# Patient Record
Sex: FEMALE | Race: OTHER | HISPANIC OR LATINO | Employment: PART TIME | URBAN - METROPOLITAN AREA
[De-identification: names, ages, dates, MRNs, and addresses within clinical notes are randomized per-mention and may not be internally consistent; named-entity substitution may affect disease eponyms.]

---
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
## 2022-01-03 PROCEDURE — 99284 EMERGENCY DEPT VISIT MOD MDM: CPT

## 2022-01-04 ENCOUNTER — HOSPITAL ENCOUNTER (EMERGENCY)
Facility: HOSPITAL | Age: 22
Discharge: HOME | End: 2022-01-04
Attending: EMERGENCY MEDICINE
Payer: COMMERCIAL

## 2022-01-04 ENCOUNTER — APPOINTMENT (EMERGENCY)
Dept: ULTRASOUND IMAGING | Facility: HOSPITAL | Age: 22
End: 2022-01-04
Payer: COMMERCIAL

## 2022-01-04 VITALS
SYSTOLIC BLOOD PRESSURE: 117 MMHG | TEMPERATURE: 98 F | RESPIRATION RATE: 16 BRPM | OXYGEN SATURATION: 100 % | HEART RATE: 75 BPM | DIASTOLIC BLOOD PRESSURE: 68 MMHG | WEIGHT: 119.49 LBS

## 2022-01-04 DIAGNOSIS — O21.9 NAUSEA AND VOMITING IN PREGNANCY: Primary | ICD-10-CM

## 2022-01-04 DIAGNOSIS — N39.0 UTI (URINARY TRACT INFECTION): ICD-10-CM

## 2022-01-04 DIAGNOSIS — R10.9 ABDOMINAL PAIN: ICD-10-CM

## 2022-01-04 LAB
ALBUMIN SERPL BCP-MCNC: 3 G/DL (ref 3.5–5)
ALP SERPL-CCNC: 80 U/L (ref 46–384)
ALT SERPL W P-5'-P-CCNC: 17 U/L (ref 12–78)
ANION GAP SERPL CALCULATED.3IONS-SCNC: 9 MMOL/L (ref 4–13)
AST SERPL W P-5'-P-CCNC: 13 U/L (ref 5–45)
BACTERIA UR QL AUTO: ABNORMAL /HPF
BASOPHILS # BLD AUTO: 0.03 THOUSANDS/ΜL (ref 0–0.1)
BASOPHILS NFR BLD AUTO: 1 % (ref 0–1)
BILIRUB SERPL-MCNC: 0.24 MG/DL (ref 0.2–1)
BILIRUB UR QL STRIP: NEGATIVE
BUN SERPL-MCNC: 5 MG/DL (ref 5–25)
CALCIUM ALBUM COR SERPL-MCNC: 9.5 MG/DL (ref 8.3–10.1)
CALCIUM SERPL-MCNC: 8.7 MG/DL (ref 8.3–10.1)
CHLORIDE SERPL-SCNC: 103 MMOL/L (ref 100–108)
CLARITY UR: ABNORMAL
CO2 SERPL-SCNC: 25 MMOL/L (ref 21–32)
COLOR UR: YELLOW
CREAT SERPL-MCNC: 0.48 MG/DL (ref 0.6–1.3)
EOSINOPHIL # BLD AUTO: 0.09 THOUSAND/ΜL (ref 0–0.61)
EOSINOPHIL NFR BLD AUTO: 2 % (ref 0–6)
ERYTHROCYTE [DISTWIDTH] IN BLOOD BY AUTOMATED COUNT: 12.8 % (ref 11.6–15.1)
GFR SERPL CREATININE-BSD FRML MDRD: 142 ML/MIN/1.73SQ M
GLUCOSE SERPL-MCNC: 84 MG/DL (ref 65–140)
GLUCOSE UR STRIP-MCNC: NEGATIVE MG/DL
HCT VFR BLD AUTO: 38 % (ref 34.8–46.1)
HGB BLD-MCNC: 12.7 G/DL (ref 11.5–15.4)
HGB UR QL STRIP.AUTO: NEGATIVE
IMM GRANULOCYTES # BLD AUTO: 0.01 THOUSAND/UL (ref 0–0.2)
IMM GRANULOCYTES NFR BLD AUTO: 0 % (ref 0–2)
KETONES UR STRIP-MCNC: ABNORMAL MG/DL
LEUKOCYTE ESTERASE UR QL STRIP: NEGATIVE
LYMPHOCYTES # BLD AUTO: 1.53 THOUSANDS/ΜL (ref 0.6–4.47)
LYMPHOCYTES NFR BLD AUTO: 35 % (ref 14–44)
MCH RBC QN AUTO: 30.8 PG (ref 26.8–34.3)
MCHC RBC AUTO-ENTMCNC: 33.4 G/DL (ref 31.4–37.4)
MCV RBC AUTO: 92 FL (ref 82–98)
MONOCYTES # BLD AUTO: 0.58 THOUSAND/ΜL (ref 0.17–1.22)
MONOCYTES NFR BLD AUTO: 13 % (ref 4–12)
MUCOUS THREADS UR QL AUTO: ABNORMAL
NEUTROPHILS # BLD AUTO: 2.17 THOUSANDS/ΜL (ref 1.85–7.62)
NEUTS SEG NFR BLD AUTO: 49 % (ref 43–75)
NITRITE UR QL STRIP: NEGATIVE
NON-SQ EPI CELLS URNS QL MICRO: ABNORMAL /HPF
NRBC BLD AUTO-RTO: 0 /100 WBCS
PH UR STRIP.AUTO: 5.5 [PH] (ref 4.5–8)
PLATELET # BLD AUTO: 245 THOUSANDS/UL (ref 149–390)
PMV BLD AUTO: 9.2 FL (ref 8.9–12.7)
POTASSIUM SERPL-SCNC: 3.7 MMOL/L (ref 3.5–5.3)
PROT SERPL-MCNC: 6.8 G/DL (ref 6.4–8.2)
PROT UR STRIP-MCNC: ABNORMAL MG/DL
RBC # BLD AUTO: 4.13 MILLION/UL (ref 3.81–5.12)
RBC #/AREA URNS AUTO: ABNORMAL /HPF
SODIUM SERPL-SCNC: 137 MMOL/L (ref 136–145)
SP GR UR STRIP.AUTO: >=1.03 (ref 1–1.03)
UROBILINOGEN UR QL STRIP.AUTO: 0.2 E.U./DL
WBC # BLD AUTO: 4.41 THOUSAND/UL (ref 4.31–10.16)
WBC #/AREA URNS AUTO: ABNORMAL /HPF

## 2022-01-04 PROCEDURE — 81001 URINALYSIS AUTO W/SCOPE: CPT

## 2022-01-04 PROCEDURE — 87086 URINE CULTURE/COLONY COUNT: CPT

## 2022-01-04 PROCEDURE — 76801 OB US < 14 WKS SINGLE FETUS: CPT

## 2022-01-04 PROCEDURE — 80053 COMPREHEN METABOLIC PANEL: CPT | Performed by: PHYSICIAN ASSISTANT

## 2022-01-04 PROCEDURE — 99284 EMERGENCY DEPT VISIT MOD MDM: CPT | Performed by: PHYSICIAN ASSISTANT

## 2022-01-04 PROCEDURE — 96366 THER/PROPH/DIAG IV INF ADDON: CPT

## 2022-01-04 PROCEDURE — 36415 COLL VENOUS BLD VENIPUNCTURE: CPT | Performed by: PHYSICIAN ASSISTANT

## 2022-01-04 PROCEDURE — 85025 COMPLETE CBC W/AUTO DIFF WBC: CPT | Performed by: PHYSICIAN ASSISTANT

## 2022-01-04 PROCEDURE — 96375 TX/PRO/DX INJ NEW DRUG ADDON: CPT

## 2022-01-04 PROCEDURE — 96365 THER/PROPH/DIAG IV INF INIT: CPT

## 2022-01-04 RX ORDER — CEPHALEXIN 500 MG/1
500 CAPSULE ORAL EVERY 6 HOURS SCHEDULED
Qty: 28 CAPSULE | Refills: 0 | Status: SHIPPED | OUTPATIENT
Start: 2022-01-04 | End: 2022-01-11

## 2022-01-04 RX ORDER — METOCLOPRAMIDE HYDROCHLORIDE 5 MG/ML
10 INJECTION INTRAMUSCULAR; INTRAVENOUS ONCE
Status: COMPLETED | OUTPATIENT
Start: 2022-01-04 | End: 2022-01-04

## 2022-01-04 RX ORDER — ONDANSETRON 4 MG/1
4 TABLET, ORALLY DISINTEGRATING ORAL EVERY 6 HOURS PRN
Qty: 20 TABLET | Refills: 0 | Status: SHIPPED | OUTPATIENT
Start: 2022-01-04

## 2022-01-04 RX ADMIN — METOCLOPRAMIDE 10 MG: 5 INJECTION, SOLUTION INTRAMUSCULAR; INTRAVENOUS at 07:54

## 2022-01-04 RX ADMIN — SODIUM CHLORIDE, SODIUM LACTATE, POTASSIUM CHLORIDE, AND CALCIUM CHLORIDE 1000 ML: .6; .31; .03; .02 INJECTION, SOLUTION INTRAVENOUS at 07:44

## 2022-01-04 NOTE — ED PROVIDER NOTES
History  Chief Complaint   Patient presents with    Vomiting During Pregnancy     12 weeks pregnant - vomiting for the pats 24 hours, abodminal pain  This is a 61-year-old female who is approximately 12 weeks pregnant presents emergency department with lower abdominal pain and vomiting for the last 24 hours  It sudden onset of nausea and vomiting with associated lower abdominal pain without fever or chills  No cough, congestion, shortness of breath or chest pain  Last episode of vomiting was 2 hours ago  Denies diarrhea  Reports history of previous miscarriage and denies any vaginal bleeding but is concerned given abdominal pain  Denies vaginal discharge or leaking  None       History reviewed  No pertinent past medical history  History reviewed  No pertinent surgical history  History reviewed  No pertinent family history  I have reviewed and agree with the history as documented  E-Cigarette/Vaping     E-Cigarette/Vaping Substances     Social History     Tobacco Use    Smoking status: Never Smoker    Smokeless tobacco: Never Used   Substance Use Topics    Alcohol use: Never    Drug use: Never       Review of Systems   Constitutional: Negative for chills and fever  HENT: Negative for ear pain and sore throat  Eyes: Negative for pain and visual disturbance  Respiratory: Negative for cough and shortness of breath  Cardiovascular: Negative for chest pain and palpitations  Gastrointestinal: Positive for abdominal pain, nausea and vomiting  Genitourinary: Negative for dysuria and hematuria  Musculoskeletal: Negative for arthralgias and back pain  Skin: Negative for color change and rash  Neurological: Negative for seizures and syncope  Psychiatric/Behavioral: Positive for sleep disturbance  Negative for confusion  All other systems reviewed and are negative  Physical Exam  Physical Exam  Vitals and nursing note reviewed     Constitutional:       General: She is not in acute distress  Appearance: Normal appearance  She is not ill-appearing, toxic-appearing or diaphoretic  HENT:      Head: Normocephalic and atraumatic  Mouth/Throat:      Mouth: Mucous membranes are moist    Eyes:      General: No scleral icterus  Pupils: Pupils are equal, round, and reactive to light  Cardiovascular:      Rate and Rhythm: Normal rate and regular rhythm  Pulses: Normal pulses  Heart sounds: Normal heart sounds  Pulmonary:      Effort: Pulmonary effort is normal       Breath sounds: Normal breath sounds  Abdominal:      General: Abdomen is flat  There is no distension  Palpations: Abdomen is soft  Tenderness: There is no abdominal tenderness  There is no guarding or rebound  Hernia: No hernia is present  Musculoskeletal:         General: No swelling or tenderness  Normal range of motion  Cervical back: Normal range of motion  Skin:     General: Skin is warm  Capillary Refill: Capillary refill takes less than 2 seconds  Neurological:      General: No focal deficit present  Mental Status: She is alert     Psychiatric:         Mood and Affect: Mood normal          Behavior: Behavior normal          Vital Signs  ED Triage Vitals [01/03/22 1749]   Temperature Pulse Respirations Blood Pressure SpO2   98 °F (36 7 °C) 97 18 114/70 99 %      Temp Source Heart Rate Source Patient Position - Orthostatic VS BP Location FiO2 (%)   Oral Monitor Sitting Right arm --      Pain Score       7           Vitals:    01/03/22 1749 01/04/22 0522 01/04/22 1013   BP: 114/70 108/60 117/68   Pulse: 97 93 75   Patient Position - Orthostatic VS: Sitting  Lying         Visual Acuity      ED Medications  Medications   lactated ringers bolus 1,000 mL (0 mL Intravenous Stopped 1/4/22 1013)   metoclopramide (REGLAN) injection 10 mg (10 mg Intravenous Given 1/4/22 0754)       Diagnostic Studies  Results Reviewed     Procedure Component Value Units Date/Time Urine Microscopic [[id number]]  (Abnormal) Collected: 01/04/22 0858    Lab Status: Final result Specimen: Urine, Clean Catch Updated: 01/04/22 0954     RBC, UA None Seen /hpf      WBC, UA 2-4 /hpf      Epithelial Cells Moderate /hpf      Bacteria, UA Moderate /hpf      MUCUS THREADS Innumerable    Urine culture [[id number]] Collected: 01/04/22 0858    Lab Status:  In process Specimen: Urine, Clean Catch Updated: 01/04/22 0910    Urine Macroscopic, POC [[id number]]  (Abnormal) Collected: 01/04/22 0858    Lab Status: Final result Specimen: Urine Updated: 01/04/22 0859     Color, UA Yellow     Clarity, UA Slightly Cloudy     pH, UA 5 5     Leukocytes, UA Negative     Nitrite, UA Negative     Protein, UA 30 (1+) mg/dl      Glucose, UA Negative mg/dl      Ketones, UA 15 (1+) mg/dl      Urobilinogen, UA 0 2 E U /dl      Bilirubin, UA Negative     Blood, UA Negative     Specific Gravity, UA >=1 030    Narrative:      CLINITEK RESULT    Comprehensive metabolic panel [[id number]]  (Abnormal) Collected: 01/04/22 0743    Lab Status: Final result Specimen: Blood from Arm, Right Updated: 01/04/22 0842     Sodium 137 mmol/L      Potassium 3 7 mmol/L      Chloride 103 mmol/L      CO2 25 mmol/L      ANION GAP 9 mmol/L      BUN 5 mg/dL      Creatinine 0 48 mg/dL      Glucose 84 mg/dL      Calcium 8 7 mg/dL      Corrected Calcium 9 5 mg/dL      AST 13 U/L      ALT 17 U/L      Alkaline Phosphatase 80 U/L      Total Protein 6 8 g/dL      Albumin 3 0 g/dL      Total Bilirubin 0 24 mg/dL      eGFR 142 ml/min/1 73sq m     Narrative:      Zacarias guidelines for Chronic Kidney Disease (CKD):     Stage 1 with normal or high GFR (GFR > 90 mL/min/1 73 square meters)    Stage 2 Mild CKD (GFR = 60-89 mL/min/1 73 square meters)    Stage 3A Moderate CKD (GFR = 45-59 mL/min/1 73 square meters)    Stage 3B Moderate CKD (GFR = 30-44 mL/min/1 73 square meters)    Stage 4 Severe CKD (GFR = 15-29 mL/min/1 73 square meters)   Stage 5 End Stage CKD (GFR <15 mL/min/1 73 square meters)  Note: GFR calculation is accurate only with a steady state creatinine    CBC and differential [[id number]]  (Abnormal) Collected: 01/04/22 0743    Lab Status: Final result Specimen: Blood from Arm, Right Updated: 01/04/22 0811     WBC 4 41 Thousand/uL      RBC 4 13 Million/uL      Hemoglobin 12 7 g/dL      Hematocrit 38 0 %      MCV 92 fL      MCH 30 8 pg      MCHC 33 4 g/dL      RDW 12 8 %      MPV 9 2 fL      Platelets 333 Thousands/uL      nRBC 0 /100 WBCs      Neutrophils Relative 49 %      Immat GRANS % 0 %      Lymphocytes Relative 35 %      Monocytes Relative 13 %      Eosinophils Relative 2 %      Basophils Relative 1 %      Neutrophils Absolute 2 17 Thousands/µL      Immature Grans Absolute 0 01 Thousand/uL      Lymphocytes Absolute 1 53 Thousands/µL      Monocytes Absolute 0 58 Thousand/µL      Eosinophils Absolute 0 09 Thousand/µL      Basophils Absolute 0 03 Thousands/µL                  US OB < 14 weeks single or first gestation level 1   Final Result by Interface, Radiology Results In (01/04 1454)      Single live intrauterine gestation at 12 weeks 0 days (range +/- one week 1 day)  GILMA of 7/19/2022  Workstation performed: [id number]                    Procedures  Procedures         ED Course  ED Course as of 01/04/22 1612   Tue Jan 04, 2022   0847 CMP normal   0859 Immature Grans Absolute: 0 01   3535 Patient reassessed- feeling better, reviewed US with patient, will get remaining fluids and then plan for d/c to home and OB follow up   0905 PO challenge   0939 US OB IMPRESSION:     Single live intrauterine gestation at 12 weeks 0 days (range +/- one week 1 day)  4405 Concern for UTI on microscopic urine- will start cephalexin         CRAFFT      Most Recent Value   SBIRT (13-23 yo)    In order to provide better care to our patients, we are screening all of our patients for alcohol and drug use   Would it be okay to ask you these screening questions? No Filed at: 01/04/2022 0839                                    [facility]  Number of Diagnoses or Management Options  Nausea and vomiting in pregnancy: new and requires workup  UTI (urinary tract infection): new and requires workup     Amount and/or Complexity of Data Reviewed  Clinical lab tests: ordered and reviewed  Tests in the radiology section of CPT®: ordered and reviewed  Independent visualization of images, tracings, or specimens: yes    Risk of Complications, Morbidity, and/or Mortality  Presenting problems: moderate  Diagnostic procedures: moderate  Management options: moderate        Disposition  Final diagnoses:   Nausea and vomiting in pregnancy   UTI (urinary tract infection)     Time reflects when diagnosis was documented in both MDM as applicable and the Disposition within this note     Time User Action Codes Description Comment    1/4/2022 10:03 AM Nancy Medellin Add [O21 9] Nausea and vomiting in pregnancy     1/4/2022 10:04 AM Nancy Medellin Add [N39 0] UTI (urinary tract infection)     1/4/2022  2:54 PM Shellie  Add [R10 9] Abdominal pain       ED Disposition     ED Disposition Condition Date/Time Comment    Discharge Stable Tue Jan 4, 2022 10:04 AM Bel Guzman discharge to home/self care              Follow-up Information     Follow up With Specialties Details Why Contact Info Additional Information    Radha Alberts MD Family Medicine  As needed [place] [address]  [phone]       Critical access hospital[facility] Emergency Department Emergency Medicine  If symptoms worsen [address] [address]  [facility] Emergency Department, [address] [place] [address], [address]          Discharge Medication List as of 1/4/2022 10:06 AM      START taking these medications    Details   cephalexin (KEFLEX) 500 mg capsule Take 1 capsule (500 mg total) by mouth every 6 (six) hours for 7 days, Starting Tue 1/4/2022, Until Tue 1/11/2022, Normal      ondansetron (Zofran ODT) 4 mg disintegrating tablet Take 1 tablet (4 mg total) by mouth every 6 (six) hours as needed for nausea or vomiting, Starting Tue 1/4/2022, Normal             No discharge procedures on file      PDMP Review     None          ED Provider  Electronically Signed by           Viraj West PA-C  01/04/22 8972

## 2022-01-04 NOTE — DISCHARGE INSTRUCTIONS
Call OB to schedule follow up    Results for orders placed or performed during the hospital encounter of 01/04/22   CBC and differential   Result Value Ref Range    WBC 4 41 4 31 - 10 16 Thousand/uL    RBC 4 13 3 81 - 5 12 Million/uL    Hemoglobin 12 7 11 5 - 15 4 g/dL    Hematocrit 38 0 34 8 - 46 1 %    MCV 92 82 - 98 fL    MCH 30 8 26 8 - 34 3 pg    MCHC 33 4 31 4 - 37 4 g/dL    RDW 12 8 11 6 - 15 1 %    MPV 9 2 8 9 - 12 7 fL    Platelets 683 723 - 832 Thousands/uL    nRBC 0 /100 WBCs    Neutrophils Relative 49 43 - 75 %    Immat GRANS % 0 0 - 2 %    Lymphocytes Relative 35 14 - 44 %    Monocytes Relative 13 (H) 4 - 12 %    Eosinophils Relative 2 0 - 6 %    Basophils Relative 1 0 - 1 %    Neutrophils Absolute 2 17 1 85 - 7 62 Thousands/µL    Immature Grans Absolute 0 01 0 00 - 0 20 Thousand/uL    Lymphocytes Absolute 1 53 0 60 - 4 47 Thousands/µL    Monocytes Absolute 0 58 0 17 - 1 22 Thousand/µL    Eosinophils Absolute 0 09 0 00 - 0 61 Thousand/µL    Basophils Absolute 0 03 0 00 - 0 10 Thousands/µL   Comprehensive metabolic panel   Result Value Ref Range    Sodium 137 136 - 145 mmol/L    Potassium 3 7 3 5 - 5 3 mmol/L    Chloride 103 100 - 108 mmol/L    CO2 25 21 - 32 mmol/L    ANION GAP 9 4 - 13 mmol/L    BUN 5 5 - 25 mg/dL    Creatinine 0 48 (L) 0 60 - 1 30 mg/dL    Glucose 84 65 - 140 mg/dL    Calcium 8 7 8 3 - 10 1 mg/dL    Corrected Calcium 9 5 8 3 - 10 1 mg/dL    AST 13 5 - 45 U/L    ALT 17 12 - 78 U/L    Alkaline Phosphatase 80 46 - 384 U/L    Total Protein 6 8 6 4 - 8 2 g/dL    Albumin 3 0 (L) 3 5 - 5 0 g/dL    Total Bilirubin 0 24 0 20 - 1 00 mg/dL    eGFR 142 ml/min/1 73sq m   Urine Microscopic   Result Value Ref Range    RBC, UA None Seen None Seen, 2-4 /hpf    WBC, UA 2-4 None Seen, 2-4, 5-60 /hpf    Epithelial Cells Moderate (A) None Seen, Occasional /hpf    Bacteria, UA Moderate (A) None Seen, Occasional /hpf    MUCUS THREADS Innumerable (A) None Seen   Urine Macroscopic, POC   Result Value Ref Range    Color, UA Yellow     Clarity, UA Slightly Cloudy     pH, UA 5 5 4 5 - 8 0    Leukocytes, UA Negative Negative    Nitrite, UA Negative Negative    Protein, UA 30 (1+) (A) Negative mg/dl    Glucose, UA Negative Negative mg/dl    Ketones, UA 15 (1+) (A) Negative mg/dl    Urobilinogen, UA 0 2 0 2, 1 0 E U /dl E U /dl    Bilirubin, UA Negative Negative    Blood, UA Negative Negative    Specific Gravity, UA >=1 030 1 003 - 1 030     US OB limited Dating Study   Final Result      Single live intrauterine gestation at 12 weeks 0 days (range +/- one week 1 day)  GILMA of 7/19/2022              Workstation performed: [id number]

## 2022-01-05 LAB — BACTERIA UR CULT: NORMAL

## 2022-01-13 ENCOUNTER — HOSPITAL ENCOUNTER (EMERGENCY)
Facility: HOSPITAL | Age: 22
Discharge: HOME | End: 2022-01-13
Attending: EMERGENCY MEDICINE | Admitting: EMERGENCY MEDICINE
Payer: COMMERCIAL

## 2022-01-13 VITALS
DIASTOLIC BLOOD PRESSURE: 59 MMHG | OXYGEN SATURATION: 100 % | RESPIRATION RATE: 18 BRPM | HEART RATE: 92 BPM | WEIGHT: 120.37 LBS | SYSTOLIC BLOOD PRESSURE: 140 MMHG | TEMPERATURE: 98.2 F

## 2022-01-13 DIAGNOSIS — R10.9 ABDOMINAL PAIN DURING PREGNANCY: Primary | ICD-10-CM

## 2022-01-13 DIAGNOSIS — O26.899 ABDOMINAL PAIN DURING PREGNANCY: Primary | ICD-10-CM

## 2022-01-13 DIAGNOSIS — S61.309A TRAUMATIC AVULSION OF NAIL PLATE OF FINGER, INITIAL ENCOUNTER: ICD-10-CM
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
## 2022-01-13 LAB
BACTERIA UR QL AUTO: ABNORMAL /HPF
BILIRUB UR QL STRIP: NEGATIVE
CLARITY UR: CLEAR
COLOR UR: YELLOW
GLUCOSE UR STRIP-MCNC: NEGATIVE MG/DL
HGB UR QL STRIP.AUTO: NEGATIVE
KETONES UR STRIP-MCNC: ABNORMAL MG/DL
LEUKOCYTE ESTERASE UR QL STRIP: NEGATIVE
MUCOUS THREADS UR QL AUTO: ABNORMAL
NITRITE UR QL STRIP: NEGATIVE
NON-SQ EPI CELLS URNS QL MICRO: ABNORMAL /HPF
PH UR STRIP.AUTO: 6 [PH] (ref 4.5–8)
PROT UR STRIP-MCNC: ABNORMAL MG/DL
RBC #/AREA URNS AUTO: ABNORMAL /HPF
SP GR UR STRIP.AUTO: >=1.03 (ref 1–1.03)
UROBILINOGEN UR QL STRIP.AUTO: 2 E.U./DL
WBC #/AREA URNS AUTO: ABNORMAL /HPF

## 2022-01-13 PROCEDURE — 87086 URINE CULTURE/COLONY COUNT: CPT

## 2022-01-13 PROCEDURE — 99284 EMERGENCY DEPT VISIT MOD MDM: CPT

## 2022-01-13 PROCEDURE — 99285 EMERGENCY DEPT VISIT HI MDM: CPT | Performed by: EMERGENCY MEDICINE

## 2022-01-13 PROCEDURE — 81001 URINALYSIS AUTO W/SCOPE: CPT

## 2022-01-13 NOTE — ED PROVIDER NOTES
History  Chief Complaint   Patient presents with    Abdominal Pain Pregnant     12 weeks preganant - reporting abdominal pain  Denies n/v  Also reporting multiple nails removed due to trauma/injury  A 25year-old female who is  at 13w2d (by GILMA 22, noted on recent US); presents after a fall down steps that occurred two days ago  Patient states she was carrying her son's stroller, when she slipped and fell down approximately 5 steps  Patient landed on her left hip  Patient denies direct abdominal trauma  Patient denies striking her head and loss of consciousness  Patient does report that during the fall, she attempted to grab the banister which lead to the traumatic removal of two nails  Patient has been keeping the nail beds clean, although has persistent pain  Patient also reports having an episode of lower abdominal pain earlier today, which has since resolved  Pain radiated from the low back around towards the lower abdomen  Patient denies associated vaginal bleeding and discharge  Patient otherwise denies fever, chills, headache, dizziness, lightheadedness, neck pain, back pain, chest pain, shortness of breath, nausea, vomiting, diarrhea, peripheral edema and rashes  Patient contacted her OB at [address] who referred her to the ED for further evaluation  A/P:  Mechanical fall down steps, no direct abdominal trauma  Benign abdominal exam   Avulsed nails off ring and little finger of right hand  Pt otherwise resting comfortably, neurologically in tact  Patient is Rh+  Will perform bedside ultrasound to evaluate for fetal viability  Will check urine for infection  Recommend local wound care for her avulsed nails  History provided by:  Patient and medical records   used: Yes ("[employer] [id number])       Prior to Admission Medications   Prescriptions Last Dose Informant Patient Reported?  Taking?   ondansetron (Zofran ODT) 4 mg disintegrating tablet   No No   Sig: Take 1 tablet (4 mg total) by mouth every 6 (six) hours as needed for nausea or vomiting      Facility-Administered Medications: None       History reviewed  No pertinent past medical history  History reviewed  No pertinent surgical history  History reviewed  No pertinent family history  I have reviewed and agree with the history as documented  E-Cigarette/Vaping     E-Cigarette/Vaping Substances     Social History     Tobacco Use    Smoking status: Never Smoker    Smokeless tobacco: Never Used   Substance Use Topics    Alcohol use: Never    Drug use: Never       Review of Systems   Gastrointestinal: Positive for abdominal pain  Skin: Positive for wound  All other systems reviewed and are negative  Physical Exam  Physical Exam  General Appearance: alert and oriented, nad, non toxic appearing  Skin:  Warm, dry, intact  HEENT: atraumatic, normocephalic  Neck: Supple, trachea midline  Cardiac: RRR; no murmurs, rub, gallops  Pulmonary: lungs CTAB; no wheezes, rales, rhonchi  Gastrointestinal: abdomen soft, nontender, nondistended; no guarding or rebound tenderness; good bowel sounds, no mass or bruits  Extremities:  No midline or paraspinal tenderness  Extremities nontender with full range of motion  Nails of right ring and little finger traumatically removed, nail bed is scabbed over without active bleeding or drainage    No pedal edema, 2+ pulses; no calf tenderness, no clubbing, no cyanosis  Neuro:  no focal motor or sensory deficits, CN 2-12 grossly intact  Psych:  Normal mood and affect, normal judgement and insight      Vital Signs  ED Triage Vitals [01/13/22 1528]   Temperature Pulse Respirations Blood Pressure SpO2   98 2 °F (36 8 °C) 92 18 140/59 100 %      Temp Source Heart Rate Source Patient Position - Orthostatic VS BP Location FiO2 (%)   Oral Monitor Sitting Right arm --      Pain Score       --           Vitals:    01/13/22 1528   BP: 140/59   Pulse: 92   Patient Position - Orthostatic VS: Sitting         Visual Acuity      ED Medications  Medications - No data to display    Diagnostic Studies  Results Reviewed     Procedure Component Value Units Date/Time    Urine Microscopic [[id number]]  (Abnormal) Collected: 01/13/22 1719    Lab Status: Final result Specimen: Urine, Clean Catch Updated: 01/13/22 1747     RBC, UA None Seen /hpf      WBC, UA 1-2 /hpf      Epithelial Cells Occasional /hpf      Bacteria, UA Innumerable /hpf      MUCUS THREADS Occasional    Urine culture [[id number]] Collected: 01/13/22 1719    Lab Status:  In process Specimen: Urine, Clean Catch Updated: 01/13/22 1723    Urine Macroscopic, POC [[id number]]  (Abnormal) Collected: 01/13/22 1719    Lab Status: Final result Specimen: Urine Updated: 01/13/22 1721     Color, UA Yellow     Clarity, UA Clear     pH, UA 6 0     Leukocytes, UA Negative     Nitrite, UA Negative     Protein, UA Trace mg/dl      Glucose, UA Negative mg/dl      Ketones, UA Trace mg/dl      Urobilinogen, UA 2 0 E U /dl      Bilirubin, UA Negative     Blood, UA Negative     Specific Gravity, UA >=1 030    Narrative:      CLINITEK RESULT                 No orders to display              Procedures  POC Pelvic US    Date/Time: 1/13/2022 5:18 PM  Performed by: Rosalino Han DO  Authorized by: Rosalino Han DO     Patient location:  ED  Procedure details:     Exam Type:  Diagnostic    Indications: pregnant with abdominal pain      Assessment for: evaluate fetal viability      Technique:  Transabdominal obstetric (HCG+) exam    Views obtained: uterus (transverse and sagittal)      Image quality: diagnostic    Uterine findings:     Intrauterine pregnancy: identified      Single gestation: identified      Gestational sac: identified      Yolk sac: identified      Fetal pole: identified      Fetal heart rate: identified      Fetal heart rate (bpm):  155  Interpretation:     Ultrasound impressions: normal      Pregnancy findings: intrauterine pregnancy (IUP) Comments:      Fetal movement appreciated             ED Course  ED Course as of 01/13/22 2312   Thu Jan 13, 2022   1721 Leukocytes, UA: Negative  UA negative for infection                                             MDM    Disposition  Final diagnoses:   Abdominal pain during pregnancy   Traumatic avulsion of nail plate of finger, initial encounter - right fourth and fifth digits   Fall down steps, initial encounter     Time reflects when diagnosis was documented in both MDM as applicable and the Disposition within this note     Time User Action Codes Description Comment    1/13/2022  5:22 PM Adeel, [address] [O26 899,  R10 9] Abdominal pain during pregnancy     1/13/2022  5:22 PM Anibal Denis Add [S61 309A] Traumatic avulsion of nail plate of finger, initial encounter     1/13/2022  5:22 PM Sinda Flores Modify [S61 309A] Traumatic avulsion of nail plate of finger, initial encounter right fourth and fifth digits    1/13/2022  5:23 PM Adeel, [address] Fall down steps, initial encounter       ED Disposition     ED Disposition Condition Date/Time Comment    Discharge Stable u Jan 13, 2022  5:22 PM Matt Zambrano Nimesh [address] discharge to home/self care              Follow-up Information     Follow up With Specialties Details Why Contact Info Additional Information    OBGYN  Schedule an appointment as soon as possible for a visit in 1 week For re-evaluation      [address] Emergency Department Emergency Medicine Go to  If symptoms worsen [facility] [address]  112 [facility] Emergency Department, [address] [place] [address], [address]          Discharge Medication List as of 1/13/2022  5:29 PM      CONTINUE these medications which have NOT CHANGED    Details   ondansetron (Zofran ODT) 4 mg disintegrating tablet Take 1 tablet (4 mg total) by mouth every 6 (six) hours as needed for nausea or vomiting, Starting Tue 1/4/2022, Normal             No discharge procedures on file      PDMP Review     None          ED Provider  Electronically Signed by           Melida Haile DO  01/13/22 7241

## 2022-01-14 LAB — BACTERIA UR CULT: NORMAL

## 2022-03-30 ENCOUNTER — HOSPITAL ENCOUNTER (OUTPATIENT)
Facility: HOSPITAL | Age: 22
Discharge: HOME | End: 2022-03-30
Attending: OBSTETRICS & GYNECOLOGY | Admitting: OBSTETRICS & GYNECOLOGY
Payer: COMMERCIAL

## 2022-03-30 VITALS
DIASTOLIC BLOOD PRESSURE: 61 MMHG | RESPIRATION RATE: 18 BRPM | SYSTOLIC BLOOD PRESSURE: 100 MMHG | OXYGEN SATURATION: 100 % | TEMPERATURE: 98.2 F | HEART RATE: 88 BPM

## 2022-03-30 PROBLEM — N93.9 VAGINAL SPOTTING: Status: ACTIVE | Noted: 2022-03-30

## 2022-03-30 LAB
BACTERIA UR QL AUTO: ABNORMAL /HPF
BILIRUB UR QL STRIP: NEGATIVE
CLARITY UR: CLEAR
COLOR UR: YELLOW
GLUCOSE UR STRIP-MCNC: NEGATIVE MG/DL
HGB UR QL STRIP.AUTO: ABNORMAL
KETONES UR STRIP-MCNC: NEGATIVE MG/DL
LEUKOCYTE ESTERASE UR QL STRIP: ABNORMAL
NITRITE UR QL STRIP: NEGATIVE
NON-SQ EPI CELLS URNS QL MICRO: ABNORMAL /HPF
PH UR STRIP.AUTO: 6 [PH]
PROT UR STRIP-MCNC: NEGATIVE MG/DL
RBC #/AREA URNS AUTO: ABNORMAL /HPF
SP GR UR STRIP.AUTO: 1.02 (ref 1–1.03)
UROBILINOGEN UR QL STRIP.AUTO: 0.2 E.U./DL
WBC #/AREA URNS AUTO: ABNORMAL /HPF

## 2022-03-30 PROCEDURE — 99213 OFFICE O/P EST LOW 20 MIN: CPT | Performed by: OBSTETRICS & GYNECOLOGY

## 2022-03-30 PROCEDURE — 87591 N.GONORRHOEAE DNA AMP PROB: CPT | Performed by: OBSTETRICS & GYNECOLOGY

## 2022-03-30 PROCEDURE — 99202 OFFICE O/P NEW SF 15 MIN: CPT

## 2022-03-30 PROCEDURE — 87491 CHLMYD TRACH DNA AMP PROBE: CPT | Performed by: OBSTETRICS & GYNECOLOGY

## 2022-03-30 PROCEDURE — 81001 URINALYSIS AUTO W/SCOPE: CPT | Performed by: OBSTETRICS & GYNECOLOGY

## 2022-03-30 NOTE — PROGRESS NOTES
OB Triage Note  Patient of [facility]    Subjective:  23 y o   at 24w1d with vaginal spotting, normal cervical length, normal microsocpy, and no sign of bleeding now  CC:  Vaginal spotting this morning, not provoked by intercourse or other vaginal penetration  Denies cramping except occasional (1-2 per day) contraction  States she was concerned about "swelling" in her perineal area  Admits to possible change in urinary frequency (pt not sure) and states she has had a UTI in the past  (Denies hematuria/dysuria)  HPI:  Contractions:  no  Fetal movement:  yes  Vaginal bleeding:   Yes - approximately 1x2 inches on underwear x 1 this morning  Leaking of fluid:  no    OB complications:  None with this pregnancy  With prior (G1) pregnancy - oligohydramnios       Objective  Vital signs: Blood pressure 100/61, pulse 88, temperature 98 2 °F (36 8 °C), temperature source Axillary, resp  rate 18, SpO2 100 %, not currently breastfeeding  SSE: visually closed  No discharge  No bleeding  Soft ectropion  Clear mucus present  White leukorrhea present  FHT: 150's   [place]:  No ctxn  Ultrasound: transvaginal: Normal Cervical length 3 2 cm  Internal os shows flat membrane across the internal os  Clear mucus visible at internal os  No change in shape or dimension of this sonolucent area  UA: moderate leuk, negative nitrite, occ bacteria  Slides: wet mount normal epithelial cells, KOH no clue cells, dry slide no ferning    Assessment and Plan:  23 y o   at 24w1d with vaginal spotting uncertain etiology  Urine analysis no overt sign of infection, no abx at this time  Pt has appt with her OB tomorrow- recommend following up as scheduled  Pt stable for discharge

## 2022-03-30 NOTE — DISCHARGE INSTRUCTIONS
Pregnancy at 23 to 26 Weeks   AMBULATORY CARE:   What changes are happening to your body: You are now close to or at the beginning of the third trimester  The third trimester starts at 24 weeks and ends with delivery  As your baby gets larger, you may develop certain symptoms  These may include pain in your back or down the sides of your abdomen  You may also have stretch marks on your abdomen, breasts, thighs, or buttocks  You may also have constipation  Seek care immediately if:   · You develop a severe headache that does not go away  · You have new or increased vision changes, such as blurred or spotted vision  · You have new or increased swelling in your face or hands  · You have vaginal spotting or bleeding  · Your water broke or you feel warm water gushing or trickling from your vagina  Call your doctor or obstetrician if:   · You have abdominal cramps, pressure, or tightening  · You have a change in vaginal discharge  · You have light bleeding  · You have chills or a fever  · You have vaginal itching, burning, or pain  · You have yellow, green, white, or foul-smelling vaginal discharge  · You have pain or burning when you urinate, less urine than usual, or pink or bloody urine  · You have questions or concerns about your condition or care  How to care for yourself at this stage of your pregnancy:       · Eat a variety of healthy foods  Healthy foods include fruits, vegetables, whole-grain breads, low-fat dairy foods, beans, lean meats, and fish  Drink liquids as directed  Ask how much liquid to drink each day and which liquids are best for you  Limit caffeine to less than 200 milligrams each day  Limit your intake of fish to 2 servings each week  Choose fish low in mercury such as canned light tuna, shrimp, salmon, cod, or tilapia  Do not  eat fish high in mercury such as swordfish, tilefish, jhonatan mackerel, and shark  · Manage back pain    Do not stand for long periods of time or lift heavy items  Use good posture while you stand, squat, or bend  Wear low-heeled shoes with good support  Rest may also help to relieve back pain  Ask your healthcare provider about exercises you can do to strengthen your back muscles  · Take prenatal vitamins as directed  Your need for certain vitamins and minerals, such as folic acid, increases during pregnancy  Prenatal vitamins provide some of the extra vitamins and minerals you need  Prenatal vitamins may also help to decrease the risk of certain birth defects  · Talk to your healthcare provider about exercise  Moderate exercise can help you stay fit  Your healthcare provider will help you plan an exercise program that is safe for you during pregnancy  · Do not smoke  Smoking increases your risk of a miscarriage and other health problems during your pregnancy  Smoking can cause your baby to be born too early or weigh less at birth  Ask your healthcare provider for information if you need help quitting  · Do not drink alcohol  Alcohol passes from your body to your baby through the placenta  It can affect your baby's brain development and cause fetal alcohol syndrome (FAS)  FAS is a group of conditions that causes mental, behavior, and growth problems  · Talk to your healthcare provider before you take any medicines  Many medicines may harm your baby if you take them when you are pregnant  Do not take any medicines, vitamins, herbs, or supplements without first talking to your healthcare provider  Never use illegal or street drugs (such as marijuana or cocaine) while you are pregnant  Safety tips during pregnancy:   · Avoid hot tubs and saunas  Do not use a hot tub or sauna while you are pregnant, especially during your first trimester  Hot tubs and saunas may raise your baby's temperature and increase the risk of birth defects  · Avoid toxoplasmosis    This is an infection caused by eating raw meat or being around infected cat feces  It can cause birth defects, miscarriages, and other problems  Wash your hands after you touch raw meat  Make sure any meat is well-cooked before you eat it  Avoid raw eggs and unpasteurized milk  Use gloves or ask someone else to clean your cat's litter box while you are pregnant  Changes that are happening with your baby:  By 26 weeks, your baby will weigh about 2 pounds  Your baby will be about 10 inches long from the top of the head to the rump (baby's bottom)  Your baby's movements are much stronger now  Your baby's eyes are almost completely formed and can partially open  Your baby also sleeps and wakes up  What you need to know about prenatal care: Your healthcare provider will check your blood pressure and weight  You may also need the following:  · A urine test  may also be done to check for sugar and protein  These can be signs of gestational diabetes or infection  Protein in your urine may also be a sign of preeclampsia  Preeclampsia is a condition that can develop during week 20 or later of your pregnancy  It causes high blood pressure, and it can cause problems with your kidneys and other organs  · A gestational diabetes screen  may be done  Your healthcare provider may order either a 1-step or 2-step oral glucose tolerance test (OGTT)  ? 1-step OGTT:  Your blood sugar level will be tested after you have not eaten for 8 hours (fasting)  You will then be given a glucose drink  Your level will be tested again 1 hour and 2 hours after you finish the drink  ? 2-step OGTT:  You do not have to fast for the first part of the test  You will have the glucose drink at any time of day  Your blood sugar level will be checked 1 hour later  If your blood sugar is higher than a certain level, another test will be ordered  You will fast and your blood sugar level will be tested  You will have the glucose drink   Your blood will be tested again 1 hour, 2 hours, and 3 hours after you finish the glucose drink  · Fundal height  is a measurement of your uterus to check your baby's growth  This number is usually the same as the number of weeks that you have been pregnant  · Your baby's heart rate  will be checked  Follow up with your doctor or obstetrician as directed:  Write down your questions so you remember to ask them during your visits  © Copyright [employer] 2022 Information is for End User's use only and may not be sold, redistributed or otherwise used for commercial purposes  All illustrations and images included in CareNotes® are the copyrighted property of [employer]  or [address] [place] [address]  The above information is an  only  It is not intended as medical advice for individual conditions or treatments  Talk to your doctor, nurse or pharmacist before following any medical regimen to see if it is safe and effective for you

## 2022-03-31 LAB
C TRACH DNA SPEC QL NAA+PROBE: NEGATIVE
N GONORRHOEA DNA SPEC QL NAA+PROBE: NEGATIVE

## 2022-05-10 ENCOUNTER — HOSPITAL ENCOUNTER (OUTPATIENT)
Facility: HOSPITAL | Age: 22
Setting detail: OBSERVATION
Discharge: ANOTHER HOSPITAL | End: 2022-05-10
Attending: OBSTETRICS & GYNECOLOGY | Admitting: OBSTETRICS & GYNECOLOGY
Payer: COMMERCIAL

## 2022-05-10 VITALS
BODY MASS INDEX: 20.03 KG/M2 | HEIGHT: 65 IN | TEMPERATURE: 98.2 F | DIASTOLIC BLOOD PRESSURE: 73 MMHG | RESPIRATION RATE: 18 BRPM | HEART RATE: 109 BPM | OXYGEN SATURATION: 100 % | SYSTOLIC BLOOD PRESSURE: 98 MMHG

## 2022-05-10 LAB
ABO GROUP BLD: NORMAL
AMPHETAMINES SERPL QL SCN: NEGATIVE
BARBITURATES UR QL: NEGATIVE
BASOPHILS # BLD AUTO: 0.01 THOUSANDS/ΜL (ref 0–0.1)
BASOPHILS NFR BLD AUTO: 0 % (ref 0–1)
BENZODIAZ UR QL: NEGATIVE
BLD GP AB SCN SERPL QL: NEGATIVE
COCAINE UR QL: NEGATIVE
EOSINOPHIL # BLD AUTO: 0.05 THOUSAND/ΜL (ref 0–0.61)
EOSINOPHIL NFR BLD AUTO: 1 % (ref 0–6)
ERYTHROCYTE [DISTWIDTH] IN BLOOD BY AUTOMATED COUNT: 12.9 % (ref 11.6–15.1)
HCT VFR BLD AUTO: 29.3 % (ref 34.8–46.1)
HGB BLD-MCNC: 9.1 G/DL (ref 11.5–15.4)
IMM GRANULOCYTES # BLD AUTO: 0.02 THOUSAND/UL (ref 0–0.2)
IMM GRANULOCYTES NFR BLD AUTO: 0 % (ref 0–2)
LYMPHOCYTES # BLD AUTO: 1.38 THOUSANDS/ΜL (ref 0.6–4.47)
LYMPHOCYTES NFR BLD AUTO: 24 % (ref 14–44)
MCH RBC QN AUTO: 28 PG (ref 26.8–34.3)
MCHC RBC AUTO-ENTMCNC: 31.1 G/DL (ref 31.4–37.4)
MCV RBC AUTO: 90 FL (ref 82–98)
METHADONE UR QL: NEGATIVE
MONOCYTES # BLD AUTO: 0.56 THOUSAND/ΜL (ref 0.17–1.22)
MONOCYTES NFR BLD AUTO: 10 % (ref 4–12)
NEUTROPHILS # BLD AUTO: 3.83 THOUSANDS/ΜL (ref 1.85–7.62)
NEUTS SEG NFR BLD AUTO: 65 % (ref 43–75)
NRBC BLD AUTO-RTO: 0 /100 WBCS
OPIATES UR QL SCN: NEGATIVE
OXYCODONE+OXYMORPHONE UR QL SCN: NEGATIVE
PCP UR QL: NEGATIVE
PLATELET # BLD AUTO: 249 THOUSANDS/UL (ref 149–390)
PMV BLD AUTO: 9.3 FL (ref 8.9–12.7)
RBC # BLD AUTO: 3.25 MILLION/UL (ref 3.81–5.12)
RH BLD: POSITIVE
SPECIMEN EXPIRATION DATE: NORMAL
THC UR QL: NEGATIVE
WBC # BLD AUTO: 5.85 THOUSAND/UL (ref 4.31–10.16)

## 2022-05-10 PROCEDURE — 85025 COMPLETE CBC W/AUTO DIFF WBC: CPT | Performed by: OBSTETRICS & GYNECOLOGY

## 2022-05-10 PROCEDURE — 80307 DRUG TEST PRSMV CHEM ANLYZR: CPT | Performed by: OBSTETRICS & GYNECOLOGY

## 2022-05-10 PROCEDURE — NC001 PR NO CHARGE: Performed by: OBSTETRICS & GYNECOLOGY

## 2022-05-10 PROCEDURE — 86900 BLOOD TYPING SEROLOGIC ABO: CPT | Performed by: OBSTETRICS & GYNECOLOGY

## 2022-05-10 PROCEDURE — 99215 OFFICE O/P EST HI 40 MIN: CPT

## 2022-05-10 PROCEDURE — 86901 BLOOD TYPING SEROLOGIC RH(D): CPT | Performed by: OBSTETRICS & GYNECOLOGY

## 2022-05-10 PROCEDURE — 86850 RBC ANTIBODY SCREEN: CPT | Performed by: OBSTETRICS & GYNECOLOGY

## 2022-05-10 PROCEDURE — 86592 SYPHILIS TEST NON-TREP QUAL: CPT | Performed by: OBSTETRICS & GYNECOLOGY

## 2022-05-10 RX ORDER — MAGNESIUM SULFATE HEPTAHYDRATE 40 MG/ML
2 INJECTION, SOLUTION INTRAVENOUS ONCE
Status: COMPLETED | OUTPATIENT
Start: 2022-05-10 | End: 2022-05-10

## 2022-05-10 RX ORDER — INDOMETHACIN 50 MG/1
50 CAPSULE ORAL ONCE
Status: COMPLETED | OUTPATIENT
Start: 2022-05-10 | End: 2022-05-10

## 2022-05-10 RX ORDER — FERROUS SULFATE 325(65) MG
325 TABLET ORAL
COMMUNITY

## 2022-05-10 RX ORDER — INDOMETHACIN 25 MG/1
25 CAPSULE ORAL EVERY 6 HOURS
Status: DISCONTINUED | OUTPATIENT
Start: 2022-05-11 | End: 2022-05-10 | Stop reason: HOSPADM

## 2022-05-10 RX ORDER — MAGNESIUM SULFATE HEPTAHYDRATE 40 MG/ML
2 INJECTION, SOLUTION INTRAVENOUS CONTINUOUS
Status: DISCONTINUED | OUTPATIENT
Start: 2022-05-10 | End: 2022-05-10 | Stop reason: HOSPADM

## 2022-05-10 RX ORDER — SODIUM CHLORIDE, SODIUM LACTATE, POTASSIUM CHLORIDE, CALCIUM CHLORIDE 600; 310; 30; 20 MG/100ML; MG/100ML; MG/100ML; MG/100ML
50 INJECTION, SOLUTION INTRAVENOUS CONTINUOUS
Status: DISCONTINUED | OUTPATIENT
Start: 2022-05-10 | End: 2022-05-10 | Stop reason: HOSPADM

## 2022-05-10 RX ORDER — MAGNESIUM SULFATE HEPTAHYDRATE 40 MG/ML
4 INJECTION, SOLUTION INTRAVENOUS ONCE
Status: COMPLETED | OUTPATIENT
Start: 2022-05-10 | End: 2022-05-10

## 2022-05-10 RX ORDER — BETAMETHASONE SODIUM PHOSPHATE AND BETAMETHASONE ACETATE 3; 3 MG/ML; MG/ML
12 INJECTION, SUSPENSION INTRA-ARTICULAR; INTRALESIONAL; INTRAMUSCULAR; SOFT TISSUE EVERY 24 HOURS
Status: DISCONTINUED | OUTPATIENT
Start: 2022-05-10 | End: 2022-05-10 | Stop reason: HOSPADM

## 2022-05-10 RX ADMIN — MAGNESIUM SULFATE HEPTAHYDRATE 2 G/HR: 40 INJECTION, SOLUTION INTRAVENOUS at 21:14

## 2022-05-10 RX ADMIN — INDOMETHACIN 50 MG: 50 CAPSULE ORAL at 20:28

## 2022-05-10 RX ADMIN — BETAMETHASONE SODIUM PHOSPHATE AND BETAMETHASONE ACETATE 12 MG: 3; 3 INJECTION, SUSPENSION INTRA-ARTICULAR; INTRALESIONAL; INTRAMUSCULAR at 20:18

## 2022-05-10 RX ADMIN — MAGNESIUM SULFATE HEPTAHYDRATE 2 G: 40 INJECTION, SOLUTION INTRAVENOUS at 21:03

## 2022-05-10 RX ADMIN — SODIUM CHLORIDE 5 MILLION UNITS: 0.9 INJECTION, SOLUTION INTRAVENOUS at 20:09

## 2022-05-10 RX ADMIN — MAGNESIUM SULFATE HEPTAHYDRATE 4 G: 40 INJECTION, SOLUTION INTRAVENOUS at 20:35

## 2022-05-10 RX ADMIN — SODIUM CHLORIDE, SODIUM LACTATE, POTASSIUM CHLORIDE, AND CALCIUM CHLORIDE 1000 ML: .6; .31; .03; .02 INJECTION, SOLUTION INTRAVENOUS at 20:08

## 2022-05-10 NOTE — EMTALA/ACUTE CARE TRANSFER
[facility] AND DELIVERY  [address] [address] [place] [address]  Dept: [phone]      EMTALA TRANSFER CONSENT    NAME Alondra Del Valle                                         2000                              MRN [id number]    I have been informed of my rights regarding examination, treatment, and transfer   by Dr Ramón Starr: Specialized equipment and/or services available at the receiving facility (Include comment)________________________    Risks: Potential for delay in receiving treatment,Potential deterioration of medical condition,Increased discomfort during transfer,Possible worsening of condition or death during transfer      Transfer Request   I acknowledge that my medical condition has been evaluated and explained to me by the emergency department physician or other qualified medical person and/or my attending physician who has recommended and offered to me further medical examination and treatment  I understand the Hospital's obligation with respect to the treatment and stabilization of my emergency medical condition  I nevertheless request to be transferred  I release the Hospital, the doctor, and any other persons caring for me from all responsibility or liability for any injury or ill effects that may result from my transfer and agree to accept all responsibility for the consequences of my choice to transfer, rather than receive stabilizing treatment at the Hospital  I understand that because the transfer is my request, my insurance may not provide reimbursement for the services  The Hospital will assist and direct me and my family in how to make arrangements for transfer, but the hospital is not liable for any fees charged by the transport service  In spite of this understanding, I refuse to consent to further medical examination and treatment which has been offered to me, and request transfer to  [place]  Name, [facility] [address]  I authorize the performance of emergency medical procedures and treatments upon me in both transit and upon arrival at the receiving facility  Additionally, I authorize the release of any and all medical records to the receiving facility and request they be transported with me, if possible  I authorize the performance of emergency medical procedures and treatments upon me in both transit and upon arrival at the receiving facility  Additionally, I authorize the release of any and all medical records to the receiving facility and request they be transported with me, if possible  I understand that the safest mode of transportation during a medical emergency is an ambulance and that the Hospital advocates the use of this mode of transport  Risks of traveling to the receiving facility by car, including absence of medical control, life sustaining equipment, such as oxygen, and medical personnel has been explained to me and I fully understand them  (FIDE CORRECT BOX BELOW)  [  ]  I consent to the stated transfer and to be transported by ambulance/helicopter  [  ]  I consent to the stated transfer, but refuse transportation by ambulance and accept full responsibility for my transportation by car  I understand the risks of non-ambulance transfers and I exonerate the Hospital and its staff from any deterioration in my condition that results from this refusal     X___________________________________________    DATE  05/10/22  TIME________  Signature of patient or legally responsible individual signing on patient behalf           RELATIONSHIP TO PATIENT_________________________          Provider Certification    NAME Harriett Sandhoff                                        [facility] 2000                              MRN [id number]    A medical screening exam was performed on the above named patient    Based on the examination:    Condition Necessitating Transfer : NICU level of care    Patient Condition: The patient has been stabilized such that within reasonable medical probability, no material deterioration of the patient condition or the condition of the unborn child(jennifer) is likely to result from the transfer    Reason for Transfer: Level of Care needed not available at this facility    Transfer Requirements: [address] [facility], [address] [address]   · Space available and qualified personnel available for treatment as acknowledged by    · Agreed to accept transfer and to provide appropriate medical treatment as acknowledged by       Dr Anna Ibanez  · Appropriate medical records of the examination and treatment of the patient are provided at the time of transfer   [address], [address] _______  · Transfer will be performed by qualified personnel from    and appropriate transfer equipment as required, including the use of necessary and appropriate life support measures  Provider Certification: I have examined the patient and explained the following risks and benefits of being transferred/refusing transfer to the patient/family:  General risk, such as traffic hazards, adverse weather conditions, rough terrain or turbulence, possible failure of equipment (including vehicle or aircraft), or consequences of actions of persons outside the control of the transport personnel,Risk of worsening condition,Unanticipated needs of medical equipment and personnel during transport,The possibility of a transport vehicle being unavailable      Based on these reasonable risks and benefits to the patient and/or the unborn child(jennifer), and based upon the information available at the time of the patients examination, I certify that the medical benefits reasonably to be expected from the provision of appropriate medical treatments at another medical facility outweigh the increasing risks, if any, to the individuals medical condition, and in the case of labor to the unborn child, from effecting the transfer      X____________________________________________ DATE 05/10/22        TIME_______      ORIGINAL - SEND TO MEDICAL RECORDS   COPY - SEND WITH PATIENT DURING TRANSFER

## 2022-05-10 NOTE — PROGRESS NOTES
Triage Note - OB  Vidya Diaz 23 y o  female MRN: [id number]  Unit/Bed#: L&D 329-01 Encounter: [id number]    Chief Complaint: contractions    SUBJECTIVE    HPI: 23 y o   at 30w0d with c/o contractions since 1100 this morning after having intercourse with her partner  She denies any vaginal bleeding or LOF  She reports good fetal movement  She does report feeling very uncomfortable since 11 am with persistent contractions  She has been staying well hydrated today  She has a hx of PTD at 26 weeks previously, delivered at [facility]  OBJECTIVE  Estimated Date of Delivery: 22    Vitals: VSS  Vitals:    05/10/22 1842   BP: 146/82   Pulse: 102   Resp: 18   Temp: 98 2 °F (36 8 °C)   SpO2:      Body mass index is 20 03 kg/m²  FHR: 140s, reactive  [place]: q2-3m    Physical Exam  Constitutional:       Appearance: Normal appearance  HENT:      Head: Normocephalic and atraumatic  Eyes:      Extraocular Movements: Extraocular movements intact  Cardiovascular:      Rate and Rhythm: Normal rate and regular rhythm  Pulmonary:      Effort: Pulmonary effort is normal       Breath sounds: Normal breath sounds  Abdominal:      Comments: Gravid   Skin:     General: Skin is warm and dry  Neurological:      Mental Status: She is alert  Mental status is at baseline  Psychiatric:         Mood and Affect: Mood normal          Behavior: Behavior normal           Labs:   No visits with results within 1 Day(s) from this visit     Latest known visit with results is:   Admission on 2022, Discharged on 2022   Component Date Value    N gonorrhoeae, DNA Probe 2022 Negative     Chlamydia trachomatis, D* 2022 Negative     Clarity, UA 2022 Clear     Color, UA 2022 Yellow     Specific Gravity, UA 2022 1 020     pH, UA 2022 6 0     Glucose, UA 2022 Negative     Ketones, UA 2022 Negative     Blood, UA 2022 Moderate*    Protein, UA 2022 Negative     Nitrite, UA 2022 Negative     Bilirubin, UA 2022 Negative     Urobilinogen, UA 2022 0 2     Leukocytes, UA 2022 Moderate*    WBC, UA 2022 2-4     RBC, UA 2022 0-1*    Bacteria, UA 2022 Occasional     Epithelial Cells 2022 Occasional            A/P  23 y o  female  at 26w0d with c/o contractions, concerning for  labor  Possible PTL  - SVE /-2  - PCN started for GBS ppx  - Magnesium sulfate started for neuroprotection  - First dose of BTM administered  - Indocin administered in triage  - 1L LR started in triage  - F/u CBC, RPR, T&S, UDS, GC/CT    Patient normally seeks care at [facility] and declines transfer to [facility] at this time  She would like to go to [facility]  Attending on call for both L&D (Dr Cruz Britt) and NICU at [facility] accepted the transfer and the patient was taken by ambulance to [facility]  D/w Dr Nick Soni MD  OB-GYN, PGY-2  5/10/2022 6:56 PM

## 2022-05-11 LAB — RPR SER QL: NORMAL

## 2022-05-11 NOTE — NURSING NOTE
Report given to transport team  Patient left unit at 2130 for transport to [facility]

## 2022-05-11 NOTE — EMTALA/ACUTE CARE TRANSFER
[facility] AND DELIVERY  [address] [facility] [address]  Dept: [phone]      EMTALA TRANSFER CONSENT    NAME Tiesha Montgomery                                         2000                              MRN [id number]    I have been informed of my rights regarding examination, treatment, and transfer   by Dr Kody Jones: Specialized equipment and/or services available at the receiving facility (Include comment)________________________    Risks: Potential for delay in receiving treatment,Increased discomfort during transfer,Possible worsening of condition or death during transfer,Potential deterioration of medical condition      Transfer Request   I acknowledge that my medical condition has been evaluated and explained to me by the emergency department physician or other qualified medical person and/or my attending physician who has recommended and offered to me further medical examination and treatment  I understand the Hospital's obligation with respect to the treatment and stabilization of my emergency medical condition  I nevertheless request to be transferred  I release the Hospital, the doctor, and any other persons caring for me from all responsibility or liability for any injury or ill effects that may result from my transfer and agree to accept all responsibility for the consequences of my choice to transfer, rather than receive stabilizing treatment at the Hospital  I understand that because the transfer is my request, my insurance may not provide reimbursement for the services  The Hospital will assist and direct me and my family in how to make arrangements for transfer, but the hospital is not liable for any fees charged by the transport service    In spite of this understanding, I refuse to consent to further medical examination and treatment which has been offered to me, and request transfer to  Anastasia Chao Name, [address] : [address] [facility]  I authorize the performance of emergency medical procedures and treatments upon me in both transit and upon arrival at the receiving facility  Additionally, I authorize the release of any and all medical records to the receiving facility and request they be transported with me, if possible  I authorize the performance of emergency medical procedures and treatments upon me in both transit and upon arrival at the receiving facility  Additionally, I authorize the release of any and all medical records to the receiving facility and request they be transported with me, if possible  I understand that the safest mode of transportation during a medical emergency is an ambulance and that the Hospital advocates the use of this mode of transport  Risks of traveling to the receiving facility by car, including absence of medical control, life sustaining equipment, such as oxygen, and medical personnel has been explained to me and I fully understand them  (FIDE CORRECT BOX BELOW)  [  ]  I consent to the stated transfer and to be transported by ambulance/helicopter  [  ]  I consent to the stated transfer, but refuse transportation by ambulance and accept full responsibility for my transportation by car  I understand the risks of non-ambulance transfers and I exonerate the Hospital and its staff from any deterioration in my condition that results from this refusal     X___________________________________________    DATE  05/10/22  TIME________  Signature of patient or legally responsible individual signing on patient behalf           RELATIONSHIP TO PATIENT_________________________          Provider Certification    NAME Mariah Jesus                                        [facility] 2000                              MRN [id number]    A medical screening exam was performed on the above named patient    Based on the examination:    Condition Necessitating Transfer: [facility] care    Patient Condition: The patient has been stabilized such that within reasonable medical probability, no material deterioration of the patient condition or the condition of the unborn child(jennifer) is likely to result from the transfer    Reason for Transfer: Level of Care needed not available at this facility    Transfer Requirements: [address]   · Space available and qualified personnel available for treatment as acknowledged by [facility]  · Agreed to accept transfer and to provide appropriate medical treatment as acknowledged by       Dr Libby Ruelas  · Appropriate medical records of the examination and treatment of the patient are provided at the time of transfer   [address] [facility], [address] _______  · Transfer will be performed by qualified personnel from    and appropriate transfer equipment as required, including the use of necessary and appropriate life support measures      Provider Certification: I have examined the patient and explained the following risks and benefits of being transferred/refusing transfer to the patient/family:  General risk, such as traffic hazards, adverse weather conditions, rough terrain or turbulence, possible failure of equipment (including vehicle or aircraft), or consequences of actions of persons outside the control of the transport personnel,Unanticipated needs of medical equipment and personnel during transport,The possibility of a transport vehicle being unavailable,Risk of worsening condition      Based on these reasonable risks and benefits to the patient and/or the unborn child(jennifer), and based upon the information available at the time of the patients examination, I certify that the medical benefits reasonably to be expected from the provision of appropriate medical treatments at another medical facility outweigh the increasing risks, if any, to the individuals medical condition, and in the case of labor to the unborn child, from effecting the transfer      X____________________________________________ DATE 05/10/22        TIME_______      ORIGINAL - SEND TO MEDICAL RECORDS   COPY - SEND WITH PATIENT DURING TRANSFER

## 2022-05-20 ENCOUNTER — HOSPITAL ENCOUNTER (EMERGENCY)
Facility: HOSPITAL | Age: 22
Discharge: HOME | End: 2022-05-21
Attending: EMERGENCY MEDICINE | Admitting: EMERGENCY MEDICINE
Payer: COMMERCIAL

## 2022-05-20 VITALS
DIASTOLIC BLOOD PRESSURE: 59 MMHG | BODY MASS INDEX: 24.18 KG/M2 | WEIGHT: 145.28 LBS | TEMPERATURE: 97.9 F | OXYGEN SATURATION: 99 % | SYSTOLIC BLOOD PRESSURE: 121 MMHG | RESPIRATION RATE: 18 BRPM | HEART RATE: 100 BPM

## 2022-05-20 DIAGNOSIS — Z3A.31 31 WEEKS GESTATION OF PREGNANCY: ICD-10-CM

## 2022-05-20 DIAGNOSIS — J06.9 VIRAL UPPER RESPIRATORY TRACT INFECTION: Primary | ICD-10-CM

## 2022-05-20 PROCEDURE — 99282 EMERGENCY DEPT VISIT SF MDM: CPT

## 2022-05-20 RX ORDER — ACETAMINOPHEN 325 MG/1
650 TABLET ORAL ONCE
Status: COMPLETED | OUTPATIENT
Start: 2022-05-20 | End: 2022-05-20

## 2022-05-20 RX ADMIN — ACETAMINOPHEN 650 MG: 325 TABLET ORAL at 23:38

## 2022-05-21 NOTE — DISCHARGE INSTRUCTIONS
Take Tylenol as needed for sore throat, fever    Return to the ER for trouble breathing, inability to swallow saliva, neck pain/stiffness, high fever, rash, vision changes, decreased fetal movement, vaginal bleeding, vaginal discharge, and or contractions     Regrese a la nico de emergencias por dificultad para respirar, incapacidad para tragar saliva, dolor/rigidez en el simeon, fiebre missy, sarpullido, cambios en la visión, disminución del movimiento fetal, sangrado vaginal, flujo vaginal o contracciones

## 2022-05-22 NOTE — ED PROVIDER NOTES
History  Chief Complaint   Patient presents with    Sore Throat     Pt is 31 weeks pregnant denies any OB symptoms, c/o throat pain and runny nose since yesterday     The patient is a 37-year-old female who is currently 31 weeks pregnant () who presents to the emergency department for evaluation of a sore throat, congestion, rhinorrhea that began yesterday  The patient is present with her son, who has similar symptoms  On 05/10/2022, the patient was admitted to [facility] after having premature contractions  She was subsequently discharged  She denies any abdominal pain, decreased fetal movement, vaginal discharge, vaginal bleeding, back pain at this time  She otherwise denies chest pain, dyspnea, lightheadedness, leg swelling, syncope, nausea, vomiting, diarrhea, melena, hematochezia, dysuria, hematuria  Prior to Admission Medications   Prescriptions Last Dose Informant Patient Reported? Taking?   ferrous sulfate 325 (65 Fe) mg tablet   Yes No   Sig: Take 325 mg by mouth daily with breakfast   ondansetron (Zofran ODT) 4 mg disintegrating tablet   No No   Sig: Take 1 tablet (4 mg total) by mouth every 6 (six) hours as needed for nausea or vomiting      Facility-Administered Medications: None       History reviewed  No pertinent past medical history  History reviewed  No pertinent surgical history  History reviewed  No pertinent family history  I have reviewed and agree with the history as documented  E-Cigarette/Vaping     E-Cigarette/Vaping Substances     Social History     Tobacco Use    Smoking status: Never Smoker    Smokeless tobacco: Never Used   Substance Use Topics    Alcohol use: Never    Drug use: Never       Review of Systems   Constitutional: Negative for chills and fever  HENT: Positive for congestion, rhinorrhea and sore throat  Negative for trouble swallowing  Respiratory: Negative for cough and shortness of breath      Cardiovascular: Negative for chest pain and leg swelling  Gastrointestinal: Negative for abdominal pain, constipation, diarrhea, nausea and vomiting  Genitourinary: Negative for dysuria, flank pain, pelvic pain, vaginal bleeding, vaginal discharge and vaginal pain  Musculoskeletal: Negative for arthralgias and myalgias  Skin: Negative for rash and wound  Neurological: Negative for dizziness, weakness, numbness and headaches  Psychiatric/Behavioral: Negative for behavioral problems  Physical Exam  Physical Exam  Vitals and nursing note reviewed  Constitutional:       General: She is not in acute distress  Appearance: She is well-developed  She is not ill-appearing or toxic-appearing  HENT:      Head: Normocephalic and atraumatic  Nose: Congestion and rhinorrhea present  Mouth/Throat:      Mouth: Mucous membranes are moist  No oral lesions  Pharynx: Uvula midline  Posterior oropharyngeal erythema (mild posterior pharyngeal) present  No pharyngeal swelling, oropharyngeal exudate or uvula swelling  Tonsils: No tonsillar exudate or tonsillar abscesses  Eyes:      Conjunctiva/sclera: Conjunctivae normal    Cardiovascular:      Rate and Rhythm: Normal rate and regular rhythm  Heart sounds: No murmur heard  Pulmonary:      Effort: Pulmonary effort is normal  No respiratory distress  Breath sounds: Normal breath sounds  Abdominal:      General: There is no distension  Palpations: Abdomen is soft  Tenderness: There is no abdominal tenderness  There is no guarding or rebound  Comments: Gravid   Musculoskeletal:         General: No tenderness  Normal range of motion  Cervical back: Neck supple  Right lower leg: No edema  Left lower leg: No edema  Skin:     General: Skin is warm and dry  Capillary Refill: Capillary refill takes less than 2 seconds  Neurological:      Mental Status: She is alert           Vital Signs  ED Triage Vitals [05/20/22 [address] Temperature Pulse Respirations Blood Pressure SpO2   97 9 °F (36 6 °C) 100 18 121/59 99 %      Temp Source Heart Rate Source Patient Position - Orthostatic VS BP Location FiO2 (%)   Oral Monitor Sitting Left arm --      Pain Score       --           Vitals:    05/20/22 2211   BP: 121/59   Pulse: 100   Patient Position - Orthostatic VS: Sitting         Visual Acuity      ED Medications  Medications   acetaminophen (TYLENOL) tablet 650 mg (650 mg Oral Given 5/20/22 8800)       Diagnostic Studies  Results Reviewed     None                 No orders to display              Procedures  Procedures         ED Course         MDM  Number of Diagnoses or Management Options  Viral upper respiratory tract infection: new and does not require workup     Amount and/or Complexity of Data Reviewed  Decide to obtain previous medical records or to obtain history from someone other than the patient: yes  Review and summarize past medical records: yes    Risk of Complications, Morbidity, and/or Mortality  Presenting problems: moderate  Management options: low    Patient Progress  Patient progress: improved     On exam, the patient is well-appearing in no acute distress  No dyspnea, tachypnea, cyanosis, or any other respiratory distress  Patient is well hydrated with moist mucous membranes  Vital signs stable  No neck stiffness  Abdomen is soft and nontender; is gravid  Patient denies vaginal discharge, vaginal bleeding, abdominal/back pain, urinary symptoms, decreased fetal movement  Will obtain fetal heart rate  Patient is here with her child who has similar symptoms  Fetal HR WINL  The patient has a history and physical exam consistent with a viral illness  COVID19 and Flu testing has not been performed after discussion with patient  I considered the patient's other medical conditions as applicable/noted above in my medical decision making  At the time of discharge, the patient is in no acute distress   I discussed with the patient the diagnosis, treatment plan, follow-up, return precautions, and discharge instructions; they were given the opportunity to ask questions and verbalized understanding  Disposition  Final diagnoses:   Viral upper respiratory tract infection   31 weeks gestation of pregnancy     Time reflects when diagnosis was documented in both MDM as applicable and the Disposition within this note     Time User Action Codes Description Comment    5/21/2022 12:44 AM Elizabeth Pierson Add [J06 9] Viral upper respiratory tract infection     5/21/2022 12:44 AM Elizabeth Pierson Add [Z3A 31] 31 weeks gestation of pregnancy       ED Disposition     ED Disposition   Discharge    Condition   Stable    Date/Time   Sat May 21, 2022 12:43 AM    Comment   Matt Zambrano Nimesh De Casiano 136 discharge to home/self care  Follow-up Information     Follow up With Specialties Details Why 60 Bam Kaminski MD [address] [place] [address]  [phone]            Discharge Medication List as of 5/21/2022 12:46 AM      CONTINUE these medications which have NOT CHANGED    Details   ferrous sulfate 325 (65 Fe) mg tablet Take 325 mg by mouth daily with breakfast, Historical Med      ondansetron (Zofran ODT) 4 mg disintegrating tablet Take 1 tablet (4 mg total) by mouth every 6 (six) hours as needed for nausea or vomiting, Starting Tue 1/4/2022, Normal             No discharge procedures on file      PDMP Review     None          ED Provider  Electronically Signed by           Sascha Lr PA-C  05/22/22 1401

## 2023-03-21 ENCOUNTER — APPOINTMENT (EMERGENCY)
Dept: ULTRASOUND IMAGING | Facility: HOSPITAL | Age: 23
End: 2023-03-21

## 2023-03-21 ENCOUNTER — HOSPITAL ENCOUNTER (EMERGENCY)
Facility: HOSPITAL | Age: 23
Discharge: HOME | End: 2023-03-21
Attending: EMERGENCY MEDICINE

## 2023-03-21 VITALS
HEART RATE: 68 BPM | SYSTOLIC BLOOD PRESSURE: 104 MMHG | RESPIRATION RATE: 18 BRPM | OXYGEN SATURATION: 100 % | TEMPERATURE: 98.4 F | DIASTOLIC BLOOD PRESSURE: 58 MMHG

## 2023-03-21 DIAGNOSIS — O20.9 VAGINAL BLEEDING IN PREGNANCY, FIRST TRIMESTER: Primary | ICD-10-CM

## 2023-03-21 DIAGNOSIS — E87.6 HYPOKALEMIA: ICD-10-CM

## 2023-03-21 LAB
ANION GAP SERPL CALCULATED.3IONS-SCNC: 6 MMOL/L (ref 4–13)
B-HCG SERPL-ACNC: 1259 MIU/ML (ref 0–11.6)
BASOPHILS # BLD AUTO: 0.04 THOUSANDS/ÂΜL (ref 0–0.1)
BASOPHILS NFR BLD AUTO: 1 % (ref 0–1)
BILIRUB UR QL STRIP: NEGATIVE
BUN SERPL-MCNC: 7 MG/DL (ref 5–25)
CALCIUM SERPL-MCNC: 6.5 MG/DL (ref 8.4–10.2)
CHLORIDE SERPL-SCNC: 116 MMOL/L (ref 96–108)
CLARITY UR: NORMAL
CO2 SERPL-SCNC: 20 MMOL/L (ref 21–32)
COLOR UR: YELLOW
CREAT SERPL-MCNC: 0.37 MG/DL (ref 0.6–1.3)
EOSINOPHIL # BLD AUTO: 0.05 THOUSAND/ÂΜL (ref 0–0.61)
EOSINOPHIL NFR BLD AUTO: 1 % (ref 0–6)
ERYTHROCYTE [DISTWIDTH] IN BLOOD BY AUTOMATED COUNT: 12.5 % (ref 11.6–15.1)
EXT PREGNANCY TEST URINE: POSITIVE
EXT. CONTROL: ABNORMAL
GFR SERPL CREATININE-BSD FRML MDRD: 153 ML/MIN/1.73SQ M
GLUCOSE SERPL-MCNC: 60 MG/DL (ref 65–140)
GLUCOSE UR STRIP-MCNC: NEGATIVE MG/DL
HCT VFR BLD AUTO: 37.4 % (ref 34.8–46.1)
HGB BLD-MCNC: 12.3 G/DL (ref 11.5–15.4)
HGB UR QL STRIP.AUTO: NEGATIVE
IMM GRANULOCYTES # BLD AUTO: 0.01 THOUSAND/UL (ref 0–0.2)
IMM GRANULOCYTES NFR BLD AUTO: 0 % (ref 0–2)
KETONES UR STRIP-MCNC: NEGATIVE MG/DL
LEUKOCYTE ESTERASE UR QL STRIP: NEGATIVE
LYMPHOCYTES # BLD AUTO: 2.42 THOUSANDS/ÂΜL (ref 0.6–4.47)
LYMPHOCYTES NFR BLD AUTO: 37 % (ref 14–44)
MCH RBC QN AUTO: 30.9 PG (ref 26.8–34.3)
MCHC RBC AUTO-ENTMCNC: 32.9 G/DL (ref 31.4–37.4)
MCV RBC AUTO: 94 FL (ref 82–98)
MONOCYTES # BLD AUTO: 0.36 THOUSAND/ÂΜL (ref 0.17–1.22)
MONOCYTES NFR BLD AUTO: 6 % (ref 4–12)
NEUTROPHILS # BLD AUTO: 3.62 THOUSANDS/ÂΜL (ref 1.85–7.62)
NEUTS SEG NFR BLD AUTO: 55 % (ref 43–75)
NITRITE UR QL STRIP: NEGATIVE
NRBC BLD AUTO-RTO: 0 /100 WBCS
PH UR STRIP.AUTO: 5.5 [PH] (ref 4.5–8)
PLATELET # BLD AUTO: 307 THOUSANDS/UL (ref 149–390)
PMV BLD AUTO: 9 FL (ref 8.9–12.7)
POTASSIUM SERPL-SCNC: 2.4 MMOL/L (ref 3.5–5.3)
PROT UR STRIP-MCNC: NEGATIVE MG/DL
RBC # BLD AUTO: 3.98 MILLION/UL (ref 3.81–5.12)
SODIUM SERPL-SCNC: 142 MMOL/L (ref 135–147)
SP GR UR STRIP.AUTO: >=1.03 (ref 1–1.03)
UROBILINOGEN UR QL STRIP.AUTO: 0.2 E.U./DL
WBC # BLD AUTO: 6.5 THOUSAND/UL (ref 4.31–10.16)

## 2023-03-21 RX ORDER — POTASSIUM CHLORIDE 20 MEQ/1
40 TABLET, EXTENDED RELEASE ORAL ONCE
Status: COMPLETED | OUTPATIENT
Start: 2023-03-21 | End: 2023-03-21

## 2023-03-21 RX ORDER — POTASSIUM CHLORIDE 14.9 MG/ML
20 INJECTION INTRAVENOUS ONCE
Status: COMPLETED | OUTPATIENT
Start: 2023-03-21 | End: 2023-03-21

## 2023-03-21 RX ADMIN — POTASSIUM CHLORIDE 40 MEQ: 1500 TABLET, EXTENDED RELEASE ORAL at 20:42

## 2023-03-21 RX ADMIN — SODIUM CHLORIDE 1000 ML: 0.9 INJECTION, SOLUTION INTRAVENOUS at 21:54

## 2023-03-21 RX ADMIN — POTASSIUM CHLORIDE 20 MEQ: 14.9 INJECTION, SOLUTION INTRAVENOUS at 21:46

## 2023-03-21 NOTE — ED ATTENDING ATTESTATION
3/21/2023  IRock DO, saw and evaluated the patient  I have discussed the patient with the resident/non-physician practitioner and agree with the resident's/non-physician practitioner's findings, Plan of Care, and MDM as documented in the resident's/non-physician practitioner's note, except where noted  All available labs and Radiology studies were reviewed  I was present for key portions of any procedure(s) performed by the resident/non-physician practitioner and I was immediately available to provide assistance  At this point I agree with the current assessment done in the Emergency Department  I have conducted an independent evaluation of this patient a history and physical is as follows:    27 yo F  5w1d pregnant by LMP presenting for evaluation of vaginal spotting  Bleeding started this morning, describes as light spotting  Denies abdominal pain/cramping    Denies lightheadedness, dizziness, CP, SOB, urinary complaints  Home pregnancy test positive, has not seen OB/had any evaluation for pregnancy yet  Taking prenatal vitamins    LMP 23  Rh+ per chart review    MDM: 27 yo F 5w1d pregnant with vaginal spotting- preg positive on urine, will get hcg quant, US to evaluate for IUP/rule out ectopic, Ua to r/o asymptomatic bacteruria, dispo pending workup    ED Course         Critical Care Time  Procedures

## 2023-03-21 NOTE — ED PROVIDER NOTES
History  Chief Complaint   Patient presents with   • Vaginal Bleeding - Pregnant     Reports positive pregnancy test last week and started with scant vaginal bleeding today  Denies abdominal pain/HA/dizziness  Pt is a 21yo F who presents for ***    LMP 2/13          Prior to Admission Medications   Prescriptions Last Dose Informant Patient Reported? Taking?   ferrous sulfate 325 (65 Fe) mg tablet   Yes No   Sig: Take 325 mg by mouth daily with breakfast   ondansetron (Zofran ODT) 4 mg disintegrating tablet   No No   Sig: Take 1 tablet (4 mg total) by mouth every 6 (six) hours as needed for nausea or vomiting      Facility-Administered Medications: None       History reviewed  No pertinent past medical history  History reviewed  No pertinent surgical history  History reviewed  No pertinent family history  I have reviewed and agree with the history as documented  E-Cigarette/Vaping     E-Cigarette/Vaping Substances     Social History     Tobacco Use   • Smoking status: Never   • Smokeless tobacco: Never   Substance Use Topics   • Alcohol use: Never   • Drug use: Never        Review of Systems   Genitourinary: Positive for vaginal bleeding  All other systems reviewed and are negative  Physical Exam  ED Triage Vitals [03/21/23 1756]   Temperature Pulse Respirations Blood Pressure SpO2   98 4 °F (36 9 °C) 84 14 118/66 100 %      Temp Source Heart Rate Source Patient Position - Orthostatic VS BP Location FiO2 (%)   Oral Monitor Sitting Right arm --      Pain Score       --             Orthostatic Vital Signs  Vitals:    03/21/23 1756   BP: 118/66   Pulse: 84   Patient Position - Orthostatic VS: Sitting       Physical Exam  Vitals reviewed  Constitutional:       General: She is not in acute distress  Appearance: She is well-developed  She is not toxic-appearing or diaphoretic  HENT:      Head: Normocephalic and atraumatic        Right Ear: External ear normal       Left Ear: External ear normal       Nose: Nose normal       Mouth/Throat:      Pharynx: Oropharynx is clear  Eyes:      Extraocular Movements: Extraocular movements intact  Pupils: Pupils are equal, round, and reactive to light  Cardiovascular:      Rate and Rhythm: Normal rate and regular rhythm  Heart sounds: Normal heart sounds  Pulmonary:      Effort: Pulmonary effort is normal  No respiratory distress  Breath sounds: Normal breath sounds  Abdominal:      General: There is no distension  Palpations: Abdomen is soft  Tenderness: There is no abdominal tenderness  Musculoskeletal:         General: Normal range of motion  Cervical back: Normal range of motion and neck supple  Right lower leg: No edema  Left lower leg: No edema  Skin:     General: Skin is warm and dry  Capillary Refill: Capillary refill takes less than 2 seconds  Coloration: Skin is not pale  Findings: No erythema or rash  Neurological:      General: No focal deficit present  Mental Status: She is alert and oriented to person, place, and time  Psychiatric:         Speech: Speech normal          Behavior: Behavior is cooperative  ED Medications  Medications - No data to display    Diagnostic Studies  Results Reviewed     None                 No orders to display         Procedures  Procedures      ED Course  ED Course as of 03/25/23 1509   Tue Mar 21, 2023   1839 Leukocytes, UA: Negative   1839 Nitrite, UA: Negative  No evidence of infection  1839 Blood, UA: Negative   1845 Per chart review, pt Rh+  Rhogam not indicated  1845 PREGNANCY TEST URINE(!): Positive  Positive  Will proceed with blood work and US     1904 Hemoglobin: 12 3  WNL and improved from prior  1904 CBC and differential  Reviewed and without actionable derangement  1959 HCG QUANTITATIVE(!): 1,259  Pt likely between 2 and 5 weeks consistent with story  2034 Potassium(!!): 2 4  Will replete      2035 Glucose, Random(!): 60  Will provide with food  [address] pregnancy limited with transvaginal  No intrauterine gestation or adnexal mass identified  Differential remains early IUP, spontaneous  and ectopic pregnancy  Correlate with serial quantitative BHCG  Mildly complex cyst in the left ovary for which follow-up ultrasound can be obtained at same time as the above process  Echogenic lesion within the right ovary which may represent a dermoid  Follow-up is recommended  2224 Discussed all results with pt as well as plan for DC with repeat hcg and OB f/u  Pt agreeable to plan  Pt requesting to stop IV potassium, will DC  Medical Decision Making  Pt is a 21yo F who presents with ***  Exam pertinent for ***  Differential diagnosis to include but not limited to ***  Plan to ***            Disposition  Final diagnoses:   None     ED Disposition     None      Follow-up Information    None         Patient's Medications   Discharge Prescriptions    No medications on file     No discharge procedures on file  PDMP Review     None           ED Provider  Attending physically available and evaluated Sinai Campos I managed the patient along with the ED Attending      Electronically Signed by CBC and differential [[id number]] Collected: 23    Lab Status: Final result Specimen: Blood from Arm, Right Updated: 23     WBC 6 50 Thousand/uL      RBC 3 98 Million/uL      Hemoglobin 12 3 g/dL      Hematocrit 37 4 %      MCV 94 fL      MCH 30 9 pg      MCHC 32 9 g/dL      RDW 12 5 %      MPV 9 0 fL      Platelets 727 Thousands/uL      nRBC 0 /100 WBCs      Neutrophils Relative 55 %      Immat GRANS % 0 %      Lymphocytes Relative 37 %      Monocytes Relative 6 %      Eosinophils Relative 1 %      Basophils Relative 1 %      Neutrophils Absolute 3 62 Thousands/µL      Immature Grans Absolute 0 01 Thousand/uL      Lymphocytes Absolute 2 42 Thousands/µL      Monocytes Absolute 0 36 Thousand/µL      Eosinophils Absolute 0 05 Thousand/µL      Basophils Absolute 0 04 Thousands/µL     POCT pregnancy, urine [[id number]]  (Abnormal) Resulted: 23    Lab Status: Final result Updated: 23     EXT Preg Test, Ur Positive     Control Valid    Urine Macroscopic, POC [[id number]] Collected: 23    Lab Status: Final result Specimen: Urine Updated: 23     Color, UA Yellow     Clarity, UA Cloudy     pH, UA 5 5     Leukocytes, UA Negative     Nitrite, UA Negative     Protein, UA Negative mg/dl      Glucose, UA Negative mg/dl      Ketones, UA Negative mg/dl      Urobilinogen, UA 0 2 E U /dl      Bilirubin, UA Negative     Occult Blood, UA Negative     Specific Gravity, UA >=1 030    Narrative:      CLINITEK RESULT                 US OB pregnancy limited with transvaginal   Final Result by Walter Martino DO (2212)   No intrauterine gestation or adnexal mass identified  Differential remains early IUP, spontaneous  and ectopic pregnancy  Correlate with serial quantitative BHCG  Mildly complex cyst in the left ovary for which follow-up ultrasound can be obtained at same time as the above process        Echogenic lesion within the right ovary which may represent a dermoid  Follow-up is recommended  The study was marked in [facility] for immediate notification  Workstation performed: [id number]               Procedures  Procedures      ED Course  ED Course as of 23 1411   e Mar 21, 2023   1839 Leukocytes, UA: Negative    Nitrite, UA: Negative  No evidence of infection   Blood, UA: Negative   184 Per chart review, pt Rh+  Rhogam not indicated   PREGNANCY TEST URINE(!): Positive  Positive  Will proceed with blood work and US      Hemoglobin: 12 3  WNL and improved from prior   CBC and differential  Reviewed and without actionable derangement   HCG QUANTITATIVE(!): 1,259  Pt likely between 2 and 5 weeks consistent with story   Potassium(!!): 2 4  Will replete   Glucose, Random(!): 60  Will provide with food  [address] pregnancy limited with transvaginal  No intrauterine gestation or adnexal mass identified  Differential remains early IUP, spontaneous  and ectopic pregnancy  Correlate with serial quantitative BHCG  Mildly complex cyst in the left ovary for which follow-up ultrasound can be obtained at same time as the above process  Echogenic lesion within the right ovary which may represent a dermoid  Follow-up is recommended  2224 Discussed all results with pt as well as plan for DC with repeat hcg and OB f/u  Pt agreeable to plan  Pt requesting to stop IV potassium, will DC  SBIRT 20yo+    Flowsheet Row Most Recent Value   SBIRT (25 yo +)    In order to provide better care to our patients, we are screening all of our patients for alcohol and drug use  Would it be okay to ask you these screening questions? No Filed at: 2023                Medical Decision Making  Pt is a 19yo F who presents with vaginal bleeding in pregnancy  Exam unremarkable      Differential diagnosis to include but not limited to bleeding in pregnancy, threatened miscarriage, menstruation  Will plan for UA and confirm pregnancy test via urine  Patient found to be positive  Will plan for basic blood work as well as beta quant and ultrasound  Patient asymptomatic and not requiring intervention or symptom control at this time  See ED course for results and details  Plan to discharge pt with f/u to OB/GYN  Discussed returning the ED with new or worsening of symptoms  Discussed use of Tylenol as stated on the bottle as needed for pain  Discussed following up with repeat beta quant outpatient  Pt expressed understanding of discharge instructions, return precautions, and medication instructions  All questions were answered and pt was discharged without incident  Amount and/or Complexity of Data Reviewed  Labs: ordered  Decision-making details documented in ED Course  Radiology: ordered  Decision-making details documented in ED Course  Risk  Prescription drug management  Disposition  Final diagnoses:   Vaginal bleeding in pregnancy, first trimester   Hypokalemia     Time reflects when diagnosis was documented in both MDM as applicable and the Disposition within this note     Time User Action Codes Description Comment    3/21/2023 10:18 PM [address] [O20 9] Vaginal bleeding in pregnancy, first trimester     3/21/2023 10:18 PM Rj Howard [E87 6] Hypokalemia       ED Disposition     ED Disposition   Discharge    Condition   Stable    Date/Time   Tue Mar 21, 2023 10:19 PM    Comment   Bel Aleman discharge to home/self care                 Follow-up Information     Follow up With Specialties Details Why Contact Info Additional [address] Obstetrics and Gynecology   [address] [facility] [address] [facility] [address]  [address], [address]   Matt Gonzáles 169 9658 [facility] Obstetrics and Gynecology   [address]  [address] [facility] [address] [address], [address], [address] [place], [place], [address] [phone]          Discharge Medication List as of 3/21/2023 10:21 PM      CONTINUE these medications which have NOT CHANGED    Details   ferrous sulfate 325 (65 Fe) mg tablet Take 325 mg by mouth daily with breakfast, Historical Med      ondansetron (Zofran ODT) 4 mg disintegrating tablet Take 1 tablet (4 mg total) by mouth every 6 (six) hours as needed for nausea or vomiting, Starting Tue 1/4/2022, Normal           Outpatient Discharge Orders   hCG, quantitative   Standing Status: Future Standing Exp  Date: 03/21/24       PDMP Review     None           ED Provider  Attending physically available and evaluated Ervin Valiente  RICKIE managed the patient along with the ED Attending      Electronically Signed by         Elaine Harrison MD  03/29/23 5008

## 2023-03-22 NOTE — DISCHARGE INSTRUCTIONS
Follow-up with ObGyn for further care  Contact info provided below if needed  Continue prenatal vitamins  Repeat blood work in 48 hours  (Script provided)  Return to the ED with new or worsening symptoms  Your ultrasound showed:  "Mildly complex cyst in the left ovary for which follow-up ultrasound can be obtained at same time as the above process  Echogenic lesion within the right ovary which may represent a dermoid  Follow-up is recommended "  Please discuss these findings with your ObGyn at follow-up

## 2023-12-28 ENCOUNTER — HOSPITAL ENCOUNTER (EMERGENCY)
Facility: HOSPITAL | Age: 23
Discharge: HOME | End: 2023-12-29
Attending: EMERGENCY MEDICINE
Payer: COMMERCIAL

## 2023-12-28 VITALS
WEIGHT: 117.7 LBS | OXYGEN SATURATION: 97 % | DIASTOLIC BLOOD PRESSURE: 71 MMHG | SYSTOLIC BLOOD PRESSURE: 120 MMHG | BODY MASS INDEX: 19.59 KG/M2 | TEMPERATURE: 98.3 F | HEART RATE: 90 BPM | RESPIRATION RATE: 20 BRPM

## 2023-12-28 DIAGNOSIS — R51.9 HEADACHE: ICD-10-CM

## 2023-12-28 DIAGNOSIS — N39.0 UTI (URINARY TRACT INFECTION): ICD-10-CM
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
## 2023-12-28 DIAGNOSIS — R51.9 FACIAL PAIN: ICD-10-CM

## 2023-12-28 LAB
EXT PREGNANCY TEST URINE: NEGATIVE
EXT. CONTROL: NORMAL

## 2023-12-28 PROCEDURE — 99284 EMERGENCY DEPT VISIT MOD MDM: CPT

## 2023-12-28 PROCEDURE — 96372 THER/PROPH/DIAG INJ SC/IM: CPT

## 2023-12-28 PROCEDURE — 81025 URINE PREGNANCY TEST: CPT

## 2023-12-28 RX ORDER — KETOROLAC TROMETHAMINE 30 MG/ML
15 INJECTION, SOLUTION INTRAMUSCULAR; INTRAVENOUS ONCE
Status: COMPLETED | OUTPATIENT
Start: 2023-12-28 | End: 2023-12-28

## 2023-12-28 RX ORDER — METOCLOPRAMIDE 10 MG/1
10 TABLET ORAL ONCE
Status: COMPLETED | OUTPATIENT
Start: 2023-12-28 | End: 2023-12-28

## 2023-12-28 RX ADMIN — KETOROLAC TROMETHAMINE 15 MG: 30 INJECTION, SOLUTION INTRAMUSCULAR; INTRAVENOUS at 23:46

## 2023-12-28 RX ADMIN — METOCLOPRAMIDE 10 MG: 10 TABLET ORAL at 23:45

## 2023-12-29 ENCOUNTER — TELEPHONE (OUTPATIENT)
Dept: EMERGENCY DEPT | Facility: HOSPITAL | Age: 23
End: 2023-12-29

## 2023-12-29 ENCOUNTER — APPOINTMENT (EMERGENCY)
Dept: CT IMAGING | Facility: HOSPITAL | Age: 23
End: 2023-12-29
Payer: COMMERCIAL

## 2023-12-29 LAB
BACTERIA UR QL AUTO: ABNORMAL /HPF
BILIRUB UR QL STRIP: NEGATIVE
CLARITY UR: ABNORMAL
COLOR UR: ABNORMAL
GLUCOSE UR STRIP-MCNC: NEGATIVE MG/DL
HGB UR QL STRIP.AUTO: 250
KETONES UR STRIP-MCNC: NEGATIVE MG/DL
LEUKOCYTE ESTERASE UR QL STRIP: 500
NITRITE UR QL STRIP: POSITIVE
NON-SQ EPI CELLS URNS QL MICRO: ABNORMAL /HPF
PH UR STRIP.AUTO: 5 [PH]
PROT UR STRIP-MCNC: ABNORMAL MG/DL
RBC #/AREA URNS AUTO: ABNORMAL /HPF
SP GR UR STRIP.AUTO: 1.02 (ref 1–1.04)
UROBILINOGEN UA: NEGATIVE MG/DL
WBC #/AREA URNS AUTO: ABNORMAL /HPF

## 2023-12-29 PROCEDURE — 81003 URINALYSIS AUTO W/O SCOPE: CPT

## 2023-12-29 PROCEDURE — 87077 CULTURE AEROBIC IDENTIFY: CPT

## 2023-12-29 PROCEDURE — 87186 SC STD MICRODIL/AGAR DIL: CPT

## 2023-12-29 PROCEDURE — 70450 CT HEAD/BRAIN W/O DYE: CPT

## 2023-12-29 PROCEDURE — 81001 URINALYSIS AUTO W/SCOPE: CPT

## 2023-12-29 PROCEDURE — 70486 CT MAXILLOFACIAL W/O DYE: CPT

## 2023-12-29 PROCEDURE — 87086 URINE CULTURE/COLONY COUNT: CPT

## 2023-12-29 PROCEDURE — G1004 CDSM NDSC: HCPCS

## 2023-12-29 PROCEDURE — 72125 CT NECK SPINE W/O DYE: CPT

## 2023-12-29 RX ORDER — CEPHALEXIN 500 MG/1
500 CAPSULE ORAL EVERY 12 HOURS SCHEDULED
Qty: 10 CAPSULE | Refills: 0 | Status: SHIPPED | OUTPATIENT
Start: 2023-12-29 | End: 2024-01-03

## 2023-12-29 RX ORDER — CEPHALEXIN 500 MG/1
500 CAPSULE ORAL ONCE
Status: COMPLETED | OUTPATIENT
Start: 2023-12-29 | End: 2023-12-29

## 2023-12-29 RX ADMIN — CEPHALEXIN 500 MG: 500 CAPSULE ORAL at 01:06

## 2023-12-29 NOTE — ED NOTES
Pt's visitor approaching nursing station multiple times asking when results will be addressed. This RN explained to pt that the images have not resulted yet. Pt requesting to leave AMA. Provider and primary RN notified     John Guerin RN  12/29/23 0051
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
## 2023-12-29 NOTE — ED NOTES
Entered room to find pt seated on floor cross-legged, straightening her hair with a hair straightening iron.    Instructed pt that this is not the time or place for such activity.  Pt returned to stretcher.  Pt reports pain in rt parietal portion of head, under both eyes, and on left side of mouth.  Pt has ecchymosis under both eyes and at left corner of mouth.   Awake, alert, oriented x 4, MELLO WNL.  Gait steady.  Speech clear and appropriate.    Resps easy and unlabored.       Zafar Edwards RN  12/28/23 9881

## 2023-12-29 NOTE — ED NOTES
Pt's friends (x 2) being excessively loud in pt room.  Have been redirected 3 x prior to this.  With room door closed, it was still possible to hear them at far nurse's station.  Informed pt's visitors that they must lower their volume or they would be directed to leave the department.  This nurse encouraged them to speak in conversational tone, pointing out that they can clearly hear each other from across the room at normal conversational volume.        Zafar Edwards RN  12/29/23 0007

## 2023-12-31 LAB — BACTERIA UR CULT: ABNORMAL

## 2024-07-16 ENCOUNTER — HOSPITAL ENCOUNTER (EMERGENCY)
Facility: HOSPITAL | Age: 24
Discharge: HOME | End: 2024-07-16
Attending: INTERNAL MEDICINE
Payer: COMMERCIAL

## 2024-07-16 VITALS
RESPIRATION RATE: 18 BRPM | SYSTOLIC BLOOD PRESSURE: 125 MMHG | BODY MASS INDEX: 20.69 KG/M2 | OXYGEN SATURATION: 97 % | DIASTOLIC BLOOD PRESSURE: 72 MMHG | HEART RATE: 87 BPM | TEMPERATURE: 98.2 F | WEIGHT: 124.34 LBS

## 2024-07-16 DIAGNOSIS — H10.9 BACTERIAL CONJUNCTIVITIS OF LEFT EYE: Primary | ICD-10-CM

## 2024-07-16 PROCEDURE — 99282 EMERGENCY DEPT VISIT SF MDM: CPT

## 2024-07-16 PROCEDURE — 99284 EMERGENCY DEPT VISIT MOD MDM: CPT

## 2024-07-16 RX ORDER — OFLOXACIN 3 MG/ML
2 SOLUTION/ DROPS OPHTHALMIC 4 TIMES DAILY
Status: DISCONTINUED | OUTPATIENT
Start: 2024-07-16 | End: 2024-07-16 | Stop reason: HOSPADM

## 2024-07-16 RX ADMIN — OFLOXACIN 2 DROP: 3 SOLUTION/ DROPS OPHTHALMIC at 21:12

## 2024-07-17 NOTE — ED PROVIDER NOTES
History  Chief Complaint   Patient presents with    Eye Redness     Pt with swelling and redness to L eye starting three days ago. Pt reports yellow drainage.     24-year-old female presents for evaluation of left eye redness, burning, purulent discharge that began 3 days ago.  Discharge noted to be thick, greenish-yellow.  Wakes up in the morning with her eye crusted over.  No vision changes.  Unsure of any recent sick contacts.  No medications PTA.        Prior to Admission Medications   Prescriptions Last Dose Informant Patient Reported? Taking?   ferrous sulfate 325 (65 Fe) mg tablet Not Taking  Yes No   Sig: Take 325 mg by mouth daily with breakfast   Patient not taking: Reported on 7/16/2024   ondansetron (Zofran ODT) 4 mg disintegrating tablet Not Taking  No No   Sig: Take 1 tablet (4 mg total) by mouth every 6 (six) hours as needed for nausea or vomiting   Patient not taking: Reported on 7/16/2024      Facility-Administered Medications: None       History reviewed. No pertinent past medical history.    History reviewed. No pertinent surgical history.    History reviewed. No pertinent family history.  I have reviewed and agree with the history as documented.    E-Cigarette/Vaping     E-Cigarette/Vaping Substances     Social History     Tobacco Use    Smoking status: Never    Smokeless tobacco: Never   Substance Use Topics    Alcohol use: Never    Drug use: Never       Review of Systems   Constitutional:  Negative for chills and fever.   HENT:  Negative for ear pain and sore throat.    Eyes:  Positive for pain, discharge, redness and itching. Negative for photophobia and visual disturbance.   Respiratory:  Negative for cough and shortness of breath.    Cardiovascular:  Negative for chest pain and palpitations.   Gastrointestinal:  Negative for abdominal pain and vomiting.   Genitourinary:  Negative for dysuria and hematuria.   Musculoskeletal:  Negative for arthralgias and back pain.   Skin:  Negative for  color change and rash.   Neurological:  Negative for seizures and syncope.   All other systems reviewed and are negative.      Physical Exam  Physical Exam  Vitals and nursing note reviewed.   Constitutional:       General: She is not in acute distress.     Appearance: She is well-developed.   HENT:      Head: Normocephalic and atraumatic.   Eyes:      General:         Left eye: Discharge present.     Extraocular Movements: Extraocular movements intact.      Conjunctiva/sclera:      Left eye: Left conjunctiva is injected.      Pupils: Pupils are equal, round, and reactive to light.   Cardiovascular:      Rate and Rhythm: Normal rate and regular rhythm.      Heart sounds: No murmur heard.  Pulmonary:      Effort: Pulmonary effort is normal. No respiratory distress.      Breath sounds: Normal breath sounds.   Abdominal:      Palpations: Abdomen is soft.      Tenderness: There is no abdominal tenderness.   Musculoskeletal:         General: No swelling.      Cervical back: Neck supple.   Skin:     General: Skin is warm and dry.      Capillary Refill: Capillary refill takes less than 2 seconds.   Neurological:      Mental Status: She is alert.   Psychiatric:         Mood and Affect: Mood normal.         Vital Signs  ED Triage Vitals [07/16/24 2017]   Temperature Pulse Respirations Blood Pressure SpO2   98.2 °F (36.8 °C) 87 18 125/72 97 %      Temp Source Heart Rate Source Patient Position - Orthostatic VS BP Location FiO2 (%)   Oral Monitor Sitting Right arm --      Pain Score       7           Vitals:    07/16/24 2017   BP: 125/72   Pulse: 87   Patient Position - Orthostatic VS: Sitting         Visual Acuity      ED Medications  Medications - No data to display    Diagnostic Studies  Results Reviewed       None                   No orders to display              Procedures  Procedures         ED Course                                 SBIRT 20yo+      Flowsheet Row Most Recent Value   Initial Alcohol Screen: US AUDIT-C      1. How often do you have a drink containing alcohol? 0 Filed at: 07/16/2024 2018   2. How many drinks containing alcohol do you have on a typical day you are drinking?  0 Filed at: 07/16/2024 2018   3b. FEMALE Any Age, or MALE 65+: How often do you have 4 or more drinks on one occassion? 0 Filed at: 07/16/2024 2018   Audit-C Score 0 Filed at: 07/16/2024 2018   IRVING: How many times in the past year have you...    Used an illegal drug or used a prescription medication for non-medical reasons? Never Filed at: 07/16/2024 2018                      Medical Decision Making  24-year-old female presents for evaluation of left eye redness, burning, discharge worsening x 3 days.  Exam: Left eye diffusely injected, watering.  Between obvious signs of conjunctivitis and patient's reported thick purulent discharge, suspect patient has left-sided bacterial conjunctivitis.  Will treat with ofloxacin eyedrops.  Educated patient on her condition, treatment plan, supportive care and expectations.  Recommend follow-up PCP if not improving.  Patient expresses understanding of the condition, treatment plan, follow-up instructions, and return precautions.                   Disposition  Final diagnoses:   Bacterial conjunctivitis of left eye     Time reflects when diagnosis was documented in both MDM as applicable and the Disposition within this note       Time User Action Codes Description Comment    7/16/2024  9:09 PM Aiden Peters Add [H10.9] Bacterial conjunctivitis of left eye           ED Disposition       ED Disposition   Discharge    Condition   Stable    Date/Time   Tue Jul 16, 2024 2108    Comment   Bel Sanches discharge to home/self care.                   Follow-up Information       Follow up With Specialties Details Why Contact Info    Mattie Chamberlain MD Family Medicine  As needed [address] [place] [id number]  [phone]              Discharge Medication List as of 7/16/2024  9:10 PM        CONTINUE these  medications which have NOT CHANGED    Details   ferrous sulfate 325 (65 Fe) mg tablet Take 325 mg by mouth daily with breakfast, Historical Med      ondansetron (Zofran ODT) 4 mg disintegrating tablet Take 1 tablet (4 mg total) by mouth every 6 (six) hours as needed for nausea or vomiting, Starting Tue 1/4/2022, Normal             No discharge procedures on file.    PDMP Review       None            ED Provider  Electronically Signed by             Aiden Peters PA-C  07/17/24 0454

## 2024-07-17 NOTE — DISCHARGE INSTRUCTIONS
Use the provided antibiotic drops. 1-2 drops into your left eye, 4 times daily, for 5 days. If you develop symptoms in your right eye, treat the same or at least until symptoms have resolved. Make sure you wash your hands frequently as this is contagious and can be passed from direct contact.

## 2024-07-31 ENCOUNTER — HOSPITAL ENCOUNTER (EMERGENCY)
Facility: HOSPITAL | Age: 24
Discharge: HOME | End: 2024-07-31
Attending: EMERGENCY MEDICINE
Payer: COMMERCIAL

## 2024-07-31 VITALS
TEMPERATURE: 98.3 F | HEART RATE: 100 BPM | SYSTOLIC BLOOD PRESSURE: 124 MMHG | DIASTOLIC BLOOD PRESSURE: 69 MMHG | RESPIRATION RATE: 18 BRPM | OXYGEN SATURATION: 98 %

## 2024-07-31 DIAGNOSIS — N39.0 UTI (URINARY TRACT INFECTION): Primary | ICD-10-CM

## 2024-07-31 LAB
BACTERIA UR QL AUTO: ABNORMAL /HPF
BILIRUB UR QL STRIP: NEGATIVE
CLARITY UR: ABNORMAL
COLOR UR: YELLOW
EXT PREGNANCY TEST URINE: NEGATIVE
EXT. CONTROL: NORMAL
GLUCOSE UR STRIP-MCNC: NEGATIVE MG/DL
HGB UR QL STRIP.AUTO: ABNORMAL
KETONES UR STRIP-MCNC: NEGATIVE MG/DL
LEUKOCYTE ESTERASE UR QL STRIP: ABNORMAL
MUCOUS THREADS UR QL AUTO: ABNORMAL
NITRITE UR QL STRIP: NEGATIVE
NON-SQ EPI CELLS URNS QL MICRO: ABNORMAL /HPF
PH UR STRIP.AUTO: 6 [PH] (ref 4.5–8)
PROT UR STRIP-MCNC: ABNORMAL MG/DL
RBC #/AREA URNS AUTO: ABNORMAL /HPF
SP GR UR STRIP.AUTO: >=1.03 (ref 1–1.03)
UROBILINOGEN UR QL STRIP.AUTO: 0.2 E.U./DL
WBC #/AREA URNS AUTO: ABNORMAL /HPF

## 2024-07-31 PROCEDURE — 87186 SC STD MICRODIL/AGAR DIL: CPT

## 2024-07-31 PROCEDURE — 87086 URINE CULTURE/COLONY COUNT: CPT

## 2024-07-31 PROCEDURE — 99284 EMERGENCY DEPT VISIT MOD MDM: CPT | Performed by: EMERGENCY MEDICINE

## 2024-07-31 PROCEDURE — 81025 URINE PREGNANCY TEST: CPT | Performed by: EMERGENCY MEDICINE

## 2024-07-31 PROCEDURE — 87077 CULTURE AEROBIC IDENTIFY: CPT

## 2024-07-31 PROCEDURE — 81001 URINALYSIS AUTO W/SCOPE: CPT

## 2024-07-31 PROCEDURE — 99283 EMERGENCY DEPT VISIT LOW MDM: CPT

## 2024-07-31 RX ORDER — PHENAZOPYRIDINE HYDROCHLORIDE 100 MG/1
200 TABLET, FILM COATED ORAL ONCE
Status: COMPLETED | OUTPATIENT
Start: 2024-07-31 | End: 2024-07-31

## 2024-07-31 RX ORDER — CEPHALEXIN 500 MG/1
500 CAPSULE ORAL 3 TIMES DAILY
Qty: 15 CAPSULE | Refills: 0 | Status: SHIPPED | OUTPATIENT
Start: 2024-07-31 | End: 2024-08-05

## 2024-07-31 RX ORDER — PHENAZOPYRIDINE HYDROCHLORIDE 200 MG/1
200 TABLET, FILM COATED ORAL 3 TIMES DAILY
Qty: 6 TABLET | Refills: 0 | Status: SHIPPED | OUTPATIENT
Start: 2024-07-31

## 2024-07-31 RX ORDER — CEPHALEXIN 250 MG/1
500 CAPSULE ORAL ONCE
Status: COMPLETED | OUTPATIENT
Start: 2024-07-31 | End: 2024-07-31

## 2024-07-31 RX ADMIN — CEPHALEXIN 500 MG: 250 CAPSULE ORAL at 13:11

## 2024-07-31 RX ADMIN — PHENAZOPYRIDINE 200 MG: 100 TABLET ORAL at 13:12

## 2024-07-31 NOTE — Clinical Note
Bel Sanches was seen and treated in our emergency department on 7/31/2024.                Diagnosis:     Bel  may return to work on return date.    She may return on this date: 08/02/2024         If you have any questions or concerns, please don't hesitate to call.      Alexx Proctor MD    ______________________________           _______________          _______________  Hospital Representative                              Date                                Time

## 2024-08-02 LAB — BACTERIA UR CULT: ABNORMAL

## 2024-08-14 NOTE — ED PROVIDER NOTES
History  Chief Complaint   Patient presents with    Possible UTI     Blood in urine and burning with urination beginning yesterday.      24-year-old female here for evaluation of intermittent hematuria and burning with urination starting yesterday.  Describes a sensation of urinary urgency and frequency, similar to previous UTIs.  Has had normal menstrual cycle in the past month or so.  Denies fevers.  No nausea or vomiting.  No significant pelvic, abdominal, or back pain.  Denies known medical problems.          Prior to Admission Medications   Prescriptions Last Dose Informant Patient Reported? Taking?   ferrous sulfate 325 (65 Fe) mg tablet   Yes No   Sig: Take 325 mg by mouth daily with breakfast   Patient not taking: Reported on 7/16/2024   ondansetron (Zofran ODT) 4 mg disintegrating tablet   No No   Sig: Take 1 tablet (4 mg total) by mouth every 6 (six) hours as needed for nausea or vomiting   Patient not taking: Reported on 7/16/2024      Facility-Administered Medications: None       History reviewed. No pertinent past medical history.    History reviewed. No pertinent surgical history.    History reviewed. No pertinent family history.  I have reviewed and agree with the history as documented.    E-Cigarette/Vaping     E-Cigarette/Vaping Substances     Social History     Tobacco Use    Smoking status: Every Day     Current packs/day: 0.25     Types: Cigarettes    Smokeless tobacco: Never   Substance Use Topics    Alcohol use: Never    Drug use: Never       Review of Systems   Constitutional:  Negative for chills and fever.   Respiratory:  Negative for cough and shortness of breath.    Cardiovascular:  Negative for chest pain and palpitations.   Gastrointestinal:  Negative for abdominal pain, nausea and vomiting.   Genitourinary:  Positive for dysuria, frequency and hematuria. Negative for flank pain, vaginal bleeding and vaginal discharge.   Musculoskeletal:  Negative for back pain.   Skin:  Negative for  rash.   Neurological:  Negative for seizures and syncope.   All other systems reviewed and are negative.      Physical Exam  Physical Exam  Vitals and nursing note reviewed.   Constitutional:       General: She is not in acute distress.     Appearance: She is well-developed.   HENT:      Head: Normocephalic and atraumatic.   Eyes:      Conjunctiva/sclera: Conjunctivae normal.   Cardiovascular:      Rate and Rhythm: Normal rate and regular rhythm.      Heart sounds: No murmur heard.  Pulmonary:      Effort: Pulmonary effort is normal. No respiratory distress.      Breath sounds: Normal breath sounds.   Abdominal:      Palpations: Abdomen is soft.      Tenderness: There is no abdominal tenderness.   Musculoskeletal:         General: No swelling.      Cervical back: Neck supple.   Skin:     General: Skin is warm and dry.      Capillary Refill: Capillary refill takes less than 2 seconds.   Neurological:      General: No focal deficit present.      Mental Status: She is alert and oriented to person, place, and time.   Psychiatric:         Mood and Affect: Mood normal.         Vital Signs  ED Triage Vitals   Temperature Pulse Respirations Blood Pressure SpO2   07/31/24 1116 07/31/24 1118 07/31/24 1118 07/31/24 1118 07/31/24 1118   98.3 °F (36.8 °C) 100 18 124/69 98 %      Temp Source Heart Rate Source Patient Position - Orthostatic VS BP Location FiO2 (%)   07/31/24 1116 07/31/24 1118 07/31/24 1118 07/31/24 1118 --   Oral Monitor Sitting Right arm       Pain Score       07/31/24 1118       No Pain           Vitals:    07/31/24 1118   BP: 124/69   Pulse: 100   Patient Position - Orthostatic VS: Sitting         Visual Acuity      ED Medications  Medications   cephalexin (KEFLEX) capsule 500 mg (500 mg Oral Given 7/31/24 1311)   phenazopyridine (PYRIDIUM) tablet 200 mg (200 mg Oral Given 7/31/24 1312)       Diagnostic Studies  Results Reviewed       Procedure Component Value Units Date/Time    Urine culture [[id number]]   (Abnormal)  (Susceptibility) Collected: 07/31/24 1130    Lab Status: Final result Specimen: Urine, Clean Catch Updated: 08/02/24 1123     Urine Culture >100,000 cfu/ml Escherichia coli    Susceptibility       Escherichia coli (1)       Antibiotic Interpretation Microscan   Method Status    ZID Performed  Yes  FLAKITO Final    Amoxicillin + Clavulanate Susceptible <=8/4 ug/ml FLAKITO Final    Ampicillin ($$) Resistant >16.00 ug/ml FLAKITO Final    Ampicillin + Sulbactam ($) Resistant >16/8 ug/ml FLAKITO Final    Aztreonam ($$$)  Susceptible <=4 ug/ml FLAKITO Final    Cefazolin ($) Susceptible 4.00 ug/ml FLAKITO Final    Ciprofloxacin ($)  Susceptible <=0.25 ug/ml FLAKITO Final    Ertapenem ($$$) Susceptible <=0.5 ug/ml FLAKITO Final    Gentamicin ($$) Susceptible <=2 ug/ml FLAKITO Final    Levofloxacin ($) Susceptible <=0.50 ug/ml FLAKITO Final    Nitrofurantoin Susceptible <=32 ug/ml FLAKITO Final    Piperacillin + Tazobactam ($$$) Susceptible <=8 ug/ml FLAKITO Final    Tetracycline Susceptible <=4 ug/ml FLAKITO Final    Trimethoprim + Sulfamethoxazole ($$$) Susceptible <=0.5/9.5 ug/ml FLAKITO Final                       Urine Microscopic [[id number]]  (Abnormal) Collected: 07/31/24 1130    Lab Status: Final result Specimen: Urine, Clean Catch Updated: 07/31/24 1202     RBC, UA Innumerable /hpf      WBC, UA Innumerable /hpf      Epithelial Cells Occasional /hpf      Bacteria, UA Occasional /hpf      MUCUS THREADS Occasional    Urine Macroscopic, POC [[id number]]  (Abnormal) Collected: 07/31/24 1130    Lab Status: Final result Specimen: Urine Updated: 07/31/24 1132     Color, UA Yellow     Clarity, UA Cloudy     pH, UA 6.0     Leukocytes, UA Large     Nitrite, UA Negative     Protein,  (2+) mg/dl      Glucose, UA Negative mg/dl      Ketones, UA Negative mg/dl      Urobilinogen, UA 0.2 E.U./dl      Bilirubin, UA Negative     Occult Blood, UA Large     Specific Gravity, UA >=1.030    Narrative:      CLINITEK RESULT    POCT pregnancy, urine [[id number]]  (Normal)  Resulted: 07/31/24 1131    Lab Status: Final result Updated: 07/31/24 1131     EXT Preg Test, Ur Negative     Control Valid                   No orders to display              Procedures  Procedures         ED Course                                 SBIRT 22yo+      Flowsheet Row Most Recent Value   Initial Alcohol Screen: US AUDIT-C     1. How often do you have a drink containing alcohol? 1 Filed at: 07/31/2024 1133   2. How many drinks containing alcohol do you have on a typical day you are drinking?  1 Filed at: 07/31/2024 1133   3b. FEMALE Any Age, or MALE 65+: How often do you have 4 or more drinks on one occassion? 0 Filed at: 07/31/2024 1133   Audit-C Score 2 Filed at: 07/31/2024 1133   IRVING: How many times in the past year have you...    Used an illegal drug or used a prescription medication for non-medical reasons? Never Filed at: 07/31/2024 1133                      Medical Decision Making  24-year-old female here with typical UTI symptoms for about 24 hours.  No fevers, no abdominal pain, nausea, or vomiting.  Urinalysis consistent with UTI.  Pregnancy negative.  Vital signs reassuring.  Plan to start antibiotics and Pyridium, refer for outpatient follow-up with return precautions.    Amount and/or Complexity of Data Reviewed  Labs: ordered. Decision-making details documented in ED Course.    Risk  Prescription drug management.                 Disposition  Final diagnoses:   UTI (urinary tract infection)     Time reflects when diagnosis was documented in both MDM as applicable and the Disposition within this note       Time User Action Codes Description Comment    7/31/2024 12:56 PM Alexx Proctor Add [N39.0] UTI (urinary tract infection)           ED Disposition       ED Disposition   Discharge    Condition   Stable    Date/Time   Wed Jul 31, 2024 1256    Comment   [place] discharge to home/self care.                   Follow-up Information       Follow up With Specialties Details Why Contact  Info    Mattei Chamberlain MD Family Medicine   [address] [place] [id number]  [phone]              Discharge Medication List as of 7/31/2024 12:59 PM        START taking these medications    Details   cephalexin (KEFLEX) 500 mg capsule Take 1 capsule (500 mg total) by mouth 3 (three) times a day for 5 days, Starting Wed 7/31/2024, Until Mon 8/5/2024, Normal      phenazopyridine (PYRIDIUM) 200 mg tablet Take 1 tablet (200 mg total) by mouth 3 (three) times a day, Starting Wed 7/31/2024, Normal           CONTINUE these medications which have NOT CHANGED    Details   ferrous sulfate 325 (65 Fe) mg tablet Take 325 mg by mouth daily with breakfast, Historical Med      ondansetron (Zofran ODT) 4 mg disintegrating tablet Take 1 tablet (4 mg total) by mouth every 6 (six) hours as needed for nausea or vomiting, Starting Tue 1/4/2022, Normal             No discharge procedures on file.    PDMP Review       None            ED Provider  Electronically Signed by             Alexx Proctor MD  08/14/24 6799

## 2024-08-21 ENCOUNTER — HOSPITAL ENCOUNTER (EMERGENCY)
Facility: HOSPITAL | Age: 24
Discharge: HOME | End: 2024-08-21
Attending: EMERGENCY MEDICINE
Payer: COMMERCIAL

## 2024-08-21 VITALS
BODY MASS INDEX: 20.53 KG/M2 | SYSTOLIC BLOOD PRESSURE: 96 MMHG | OXYGEN SATURATION: 100 % | HEART RATE: 71 BPM | DIASTOLIC BLOOD PRESSURE: 56 MMHG | RESPIRATION RATE: 12 BRPM | HEIGHT: 65 IN | TEMPERATURE: 98.3 F | WEIGHT: 123.24 LBS

## 2024-08-21 DIAGNOSIS — R55 VASOVAGAL SYNCOPE: Primary | ICD-10-CM

## 2024-08-21 DIAGNOSIS — N39.0 UTI (URINARY TRACT INFECTION): ICD-10-CM

## 2024-08-21 LAB
ALBUMIN SERPL BCG-MCNC: 3.9 G/DL (ref 3.5–5)
ALP SERPL-CCNC: 66 U/L (ref 34–104)
ALT SERPL W P-5'-P-CCNC: 9 U/L (ref 7–52)
ANION GAP SERPL CALCULATED.3IONS-SCNC: 5 MMOL/L (ref 4–13)
AST SERPL W P-5'-P-CCNC: 15 U/L (ref 13–39)
ATRIAL RATE: 61 BPM
BACTERIA UR QL AUTO: ABNORMAL /HPF
BASOPHILS # BLD AUTO: 0.04 THOUSANDS/ÂΜL (ref 0–0.1)
BASOPHILS NFR BLD AUTO: 1 % (ref 0–1)
BILIRUB SERPL-MCNC: 0.37 MG/DL (ref 0.2–1)
BILIRUB UR QL STRIP: NEGATIVE
BUN SERPL-MCNC: 14 MG/DL (ref 5–25)
CALCIUM SERPL-MCNC: 9 MG/DL (ref 8.4–10.2)
CARDIAC TROPONIN I PNL SERPL HS: <2 NG/L
CHLORIDE SERPL-SCNC: 109 MMOL/L (ref 96–108)
CLARITY UR: ABNORMAL
CO2 SERPL-SCNC: 23 MMOL/L (ref 21–32)
COLOR UR: YELLOW
CREAT SERPL-MCNC: 0.61 MG/DL (ref 0.6–1.3)
EOSINOPHIL # BLD AUTO: 0.15 THOUSAND/ÂΜL (ref 0–0.61)
EOSINOPHIL NFR BLD AUTO: 2 % (ref 0–6)
ERYTHROCYTE [DISTWIDTH] IN BLOOD BY AUTOMATED COUNT: 12.7 % (ref 11.6–15.1)
EXT PREGNANCY TEST URINE: NEGATIVE
EXT. CONTROL: NORMAL
GFR SERPL CREATININE-BSD FRML MDRD: 129 ML/MIN/1.73SQ M
GLUCOSE SERPL-MCNC: 95 MG/DL (ref 65–140)
GLUCOSE UR STRIP-MCNC: NEGATIVE MG/DL
HCT VFR BLD AUTO: 35.2 % (ref 34.8–46.1)
HGB BLD-MCNC: 11.5 G/DL (ref 11.5–15.4)
HGB UR QL STRIP.AUTO: ABNORMAL
IMM GRANULOCYTES # BLD AUTO: 0.01 THOUSAND/UL (ref 0–0.2)
IMM GRANULOCYTES NFR BLD AUTO: 0 % (ref 0–2)
KETONES UR STRIP-MCNC: NEGATIVE MG/DL
LEUKOCYTE ESTERASE UR QL STRIP: ABNORMAL
LYMPHOCYTES # BLD AUTO: 2.97 THOUSANDS/ÂΜL (ref 0.6–4.47)
LYMPHOCYTES NFR BLD AUTO: 44 % (ref 14–44)
MCH RBC QN AUTO: 31.2 PG (ref 26.8–34.3)
MCHC RBC AUTO-ENTMCNC: 32.7 G/DL (ref 31.4–37.4)
MCV RBC AUTO: 95 FL (ref 82–98)
MONOCYTES # BLD AUTO: 0.41 THOUSAND/ÂΜL (ref 0.17–1.22)
MONOCYTES NFR BLD AUTO: 6 % (ref 4–12)
MUCOUS THREADS UR QL AUTO: ABNORMAL
NEUTROPHILS # BLD AUTO: 3.16 THOUSANDS/ÂΜL (ref 1.85–7.62)
NEUTS SEG NFR BLD AUTO: 47 % (ref 43–75)
NITRITE UR QL STRIP: NEGATIVE
NON-SQ EPI CELLS URNS QL MICRO: ABNORMAL /HPF
NRBC BLD AUTO-RTO: 0 /100 WBCS
P AXIS: 49 DEGREES
PH UR STRIP.AUTO: 6 [PH] (ref 4.5–8)
PLATELET # BLD AUTO: 224 THOUSANDS/UL (ref 149–390)
PMV BLD AUTO: 8.9 FL (ref 8.9–12.7)
POTASSIUM SERPL-SCNC: 4.2 MMOL/L (ref 3.5–5.3)
PR INTERVAL: 142 MS
PROT SERPL-MCNC: 6.2 G/DL (ref 6.4–8.4)
PROT UR STRIP-MCNC: NEGATIVE MG/DL
QRS AXIS: 79 DEGREES
QRSD INTERVAL: 86 MS
QT INTERVAL: 394 MS
QTC INTERVAL: 396 MS
RBC # BLD AUTO: 3.69 MILLION/UL (ref 3.81–5.12)
RBC #/AREA URNS AUTO: ABNORMAL /HPF
SODIUM SERPL-SCNC: 137 MMOL/L (ref 135–147)
SP GR UR STRIP.AUTO: >=1.03 (ref 1–1.03)
T WAVE AXIS: 53 DEGREES
TSH SERPL DL<=0.05 MIU/L-ACNC: 2.43 UIU/ML (ref 0.45–4.5)
UROBILINOGEN UR QL STRIP.AUTO: 0.2 E.U./DL
VENTRICULAR RATE: 61 BPM
WBC # BLD AUTO: 6.74 THOUSAND/UL (ref 4.31–10.16)
WBC #/AREA URNS AUTO: ABNORMAL /HPF

## 2024-08-21 PROCEDURE — 99284 EMERGENCY DEPT VISIT MOD MDM: CPT

## 2024-08-21 PROCEDURE — 81001 URINALYSIS AUTO W/SCOPE: CPT

## 2024-08-21 PROCEDURE — 85025 COMPLETE CBC W/AUTO DIFF WBC: CPT | Performed by: PHYSICIAN ASSISTANT

## 2024-08-21 PROCEDURE — 84443 ASSAY THYROID STIM HORMONE: CPT | Performed by: PHYSICIAN ASSISTANT

## 2024-08-21 PROCEDURE — 93005 ELECTROCARDIOGRAM TRACING: CPT

## 2024-08-21 PROCEDURE — 36415 COLL VENOUS BLD VENIPUNCTURE: CPT | Performed by: PHYSICIAN ASSISTANT

## 2024-08-21 PROCEDURE — 84484 ASSAY OF TROPONIN QUANT: CPT | Performed by: PHYSICIAN ASSISTANT

## 2024-08-21 PROCEDURE — 87086 URINE CULTURE/COLONY COUNT: CPT

## 2024-08-21 PROCEDURE — 87491 CHLMYD TRACH DNA AMP PROBE: CPT | Performed by: PHYSICIAN ASSISTANT

## 2024-08-21 PROCEDURE — 87591 N.GONORRHOEAE DNA AMP PROB: CPT | Performed by: PHYSICIAN ASSISTANT

## 2024-08-21 PROCEDURE — 93010 ELECTROCARDIOGRAM REPORT: CPT | Performed by: STUDENT IN AN ORGANIZED HEALTH CARE EDUCATION/TRAINING PROGRAM

## 2024-08-21 PROCEDURE — 80053 COMPREHEN METABOLIC PANEL: CPT | Performed by: PHYSICIAN ASSISTANT

## 2024-08-21 PROCEDURE — 99284 EMERGENCY DEPT VISIT MOD MDM: CPT | Performed by: PHYSICIAN ASSISTANT

## 2024-08-21 PROCEDURE — 81025 URINE PREGNANCY TEST: CPT | Performed by: PHYSICIAN ASSISTANT

## 2024-08-21 RX ORDER — NITROFURANTOIN 25; 75 MG/1; MG/1
100 CAPSULE ORAL 2 TIMES DAILY
Qty: 14 CAPSULE | Refills: 0 | Status: SHIPPED | OUTPATIENT
Start: 2024-08-21 | End: 2024-08-28

## 2024-08-21 NOTE — ED PROVIDER NOTES
History  Chief Complaint   Patient presents with    Syncope     Pt reports waking up this morning and went to get out of bed when she became dizzy and could not see. Pt reports syncopal episode. Unsure of time of LOC. Pt denies any symptoms in triage.     Is a 24-year-old female with no significant past medical history is coming to emergency department by her sister for evaluation of near syncopal/syncopal episode this morning.  Patient states that she was laying on the couch this morning and when she went to sit up she felt lightheaded/dizzy.  She states that her vision and hearing's seem to go and she felt like she was going to pass out.  She states she laid down and she is not sure if she passed out or not. She did not fall/hit head. She states symptoms only lasted a short time and resolved on its own. She had not eaten yet this morning.  She states she had no other preceding symptoms such as headache, chest pain, shortness of breath, abdominal pain, numbness or weakness, nausea. She has no hx of syncope/dizziness. She states she has not had any other symptoms the remainder of the day.  No recent illness/URI symptoms.  She states she was here at the end of July and diagnosed with a urinary tract infection.  At that time she was symptomatic and states she did complete her antibiotic course.  She states she had resolution of symptoms and is not having dysuria, hematuria, frequency, urgency, suprapubic pain.  She denies fever, chills, nausea, vomiting, diarrhea, CP, SOB, abdominal pain, vaginal complaints, rash, URI symptoms, headache, vision changes, numbness, weakness, bladder or bowel dysfunction.         Prior to Admission Medications   Prescriptions Last Dose Informant Patient Reported? Taking?   phenazopyridine (PYRIDIUM) 200 mg tablet   No No   Sig: Take 1 tablet (200 mg total) by mouth 3 (three) times a day      Facility-Administered Medications: None       History reviewed. No pertinent past medical  history.    History reviewed. No pertinent surgical history.    History reviewed. No pertinent family history.  I have reviewed and agree with the history as documented.    E-Cigarette/Vaping    E-Cigarette Use Current Some Day User      E-Cigarette/Vaping Substances     Social History     Tobacco Use    Smoking status: Former     Current packs/day: 0.25     Types: Cigarettes    Smokeless tobacco: Never   Vaping Use    Vaping status: Some Days   Substance Use Topics    Alcohol use: Never    Drug use: Never       Review of Systems   Constitutional:  Negative for chills and fever.   HENT:  Negative for congestion, ear pain and sore throat.    Eyes:  Negative for pain and visual disturbance.   Respiratory:  Negative for cough and shortness of breath.    Cardiovascular:  Negative for chest pain and palpitations.   Gastrointestinal:  Negative for abdominal pain, diarrhea, nausea and vomiting.   Genitourinary:  Negative for decreased urine volume, difficulty urinating, dysuria, frequency and hematuria.   Musculoskeletal:  Negative for arthralgias, back pain and neck pain.   Skin:  Negative for color change and rash.   Neurological:  Positive for syncope and light-headedness. Negative for seizures, facial asymmetry, speech difficulty, weakness, numbness and headaches.   All other systems reviewed and are negative.      Physical Exam  Physical Exam  Vitals and nursing note reviewed.   Constitutional:       General: She is not in acute distress.     Appearance: Normal appearance. She is well-developed. She is not ill-appearing, toxic-appearing or diaphoretic.   HENT:      Head: Normocephalic and atraumatic.      Right Ear: External ear normal.      Left Ear: External ear normal.      Nose: Nose normal.      Mouth/Throat:      Mouth: Mucous membranes are moist.   Eyes:      Extraocular Movements: Extraocular movements intact.      Conjunctiva/sclera: Conjunctivae normal.   Cardiovascular:      Rate and Rhythm: Normal rate and  regular rhythm.      Heart sounds: Normal heart sounds. No murmur heard.  Pulmonary:      Effort: Pulmonary effort is normal. No respiratory distress.      Breath sounds: Normal breath sounds. No stridor. No wheezing, rhonchi or rales.   Abdominal:      General: Abdomen is flat. Bowel sounds are normal. There is no distension.      Palpations: Abdomen is soft.      Tenderness: There is no abdominal tenderness. There is no guarding.   Musculoskeletal:         General: No swelling. Normal range of motion.      Cervical back: Normal range of motion and neck supple. No rigidity.   Lymphadenopathy:      Cervical: No cervical adenopathy.   Skin:     General: Skin is warm and dry.      Capillary Refill: Capillary refill takes less than 2 seconds.   Neurological:      General: No focal deficit present.      Mental Status: She is alert.      GCS: GCS eye subscore is 4. GCS verbal subscore is 5. GCS motor subscore is 6.      Sensory: Sensation is intact.      Motor: Motor function is intact.   Psychiatric:         Mood and Affect: Mood normal.         Vital Signs  ED Triage Vitals   Temperature Pulse Respirations Blood Pressure SpO2   08/21/24 1400 08/21/24 1358 08/21/24 1358 08/21/24 1358 08/21/24 1358   98.3 °F (36.8 °C) 66 18 113/56 98 %      Temp Source Heart Rate Source Patient Position - Orthostatic VS BP Location FiO2 (%)   08/21/24 1400 08/21/24 1358 08/21/24 1358 08/21/24 1358 --   Oral Monitor Sitting Right arm       Pain Score       --                  Vitals:    08/21/24 1459 08/21/24 1500 08/21/24 1501 08/21/24 1612   BP: 99/63 100/60 93/62 96/56   Pulse: 58 69 75 71   Patient Position - Orthostatic VS: Lying - Orthostatic VS Sitting - Orthostatic VS Standing - Orthostatic VS Sitting         Visual Acuity      ED Medications  Medications - No data to display    Diagnostic Studies  Results Reviewed       Procedure Component Value Units Date/Time    Chlamydia/GC amplified DNA by PCR [[id number]] Collected: 08/21/24  1619    Lab Status: In process Specimen: Urine, Other Updated: 08/21/24 1624    Urine Microscopic [[id number]]  (Abnormal) Collected: 08/21/24 1511    Lab Status: Final result Specimen: Urine, Clean Catch Updated: 08/21/24 1607     RBC, UA 2-4 /hpf      WBC, UA 20-30 /hpf      Epithelial Cells Occasional /hpf      Bacteria, UA Occasional /hpf      MUCUS THREADS Occasional    Urine culture [[id number]] Collected: 08/21/24 1511    Lab Status: In process Specimen: Urine, Clean Catch Updated: 08/21/24 1607    HS Troponin I 4hr [[id number]]     Lab Status: No result Specimen: Blood     TSH [[id number]]  (Normal) Collected: 08/21/24 1451    Lab Status: Final result Specimen: Blood from Arm, Right Updated: 08/21/24 1542     TSH 3RD GENERATON 2.433 uIU/mL     HS Troponin 0hr (reflex protocol) [[id number]]  (Normal) Collected: 08/21/24 1451    Lab Status: Final result Specimen: Blood from Arm, Right Updated: 08/21/24 1521     hs TnI 0hr <2 ng/L     HS Troponin I 2hr [[id number]]     Lab Status: No result Specimen: Blood     Comprehensive metabolic panel [[id number]]  (Abnormal) Collected: 08/21/24 1451    Lab Status: Final result Specimen: Blood from Arm, Right Updated: 08/21/24 1516     Sodium 137 mmol/L      Potassium 4.2 mmol/L      Chloride 109 mmol/L      CO2 23 mmol/L      ANION GAP 5 mmol/L      BUN 14 mg/dL      Creatinine 0.61 mg/dL      Glucose 95 mg/dL      Calcium 9.0 mg/dL      AST 15 U/L      ALT 9 U/L      Alkaline Phosphatase 66 U/L      Total Protein 6.2 g/dL      Albumin 3.9 g/dL      Total Bilirubin 0.37 mg/dL      eGFR 129 ml/min/1.73sq m     Narrative:      National Kid[employer] guidelines for Chronic Kidney Disease (CKD):     Stage 1 with normal or high GFR (GFR > 90 mL/min/1.73 square meters)    Stage 2 Mild CKD (GFR = 60-89 mL/min/1.73 square meters)    Stage 3A Moderate CKD (GFR = 45-59 mL/min/1.73 square meters)    Stage 3B Moderate CKD (GFR = 30-44 mL/min/1.73 square meters)    Stage 4 Severe  CKD (GFR = 15-29 mL/min/1.73 square meters)    Stage 5 End Stage CKD (GFR <15 mL/min/1.73 square meters)  Note: GFR calculation is accurate only with a steady state creatinine    POCT pregnancy, urine [[id number]]  (Normal) Resulted: 08/21/24 1515    Lab Status: Final result Specimen: Urine Updated: 08/21/24 1516     EXT Preg Test, Ur Negative     Control Valid    Urine Macroscopic, POC [[id number]]  (Abnormal) Collected: 08/21/24 1511    Lab Status: Final result Specimen: Urine Updated: 08/21/24 1512     Color, UA Yellow     Clarity, UA Cloudy     pH, UA 6.0     Leukocytes, UA Small     Nitrite, UA Negative     Protein, UA Negative mg/dl      Glucose, UA Negative mg/dl      Ketones, UA Negative mg/dl      Urobilinogen, UA 0.2 E.U./dl      Bilirubin, UA Negative     Occult Blood, UA Trace     Specific Gravity, UA >=1.030    Narrative:      CLINITEK RESULT    CBC and differential [[id number]]  (Abnormal) Collected: 08/21/24 1451    Lab Status: Final result Specimen: Blood from Arm, Right Updated: 08/21/24 1500     WBC 6.74 Thousand/uL      RBC 3.69 Million/uL      Hemoglobin 11.5 g/dL      Hematocrit 35.2 %      MCV 95 fL      MCH 31.2 pg      MCHC 32.7 g/dL      RDW 12.7 %      MPV 8.9 fL      Platelets 224 Thousands/uL      nRBC 0 /100 WBCs      Segmented % 47 %      Immature Grans % 0 %      Lymphocytes % 44 %      Monocytes % 6 %      Eosinophils Relative 2 %      Basophils Relative 1 %      Absolute Neutrophils 3.16 Thousands/µL      Absolute Immature Grans 0.01 Thousand/uL      Absolute Lymphocytes 2.97 Thousands/µL      Absolute Monocytes 0.41 Thousand/µL      Eosinophils Absolute 0.15 Thousand/µL      Basophils Absolute 0.04 Thousands/µL                    No orders to display              Procedures  ECG 12 Lead Documentation Only    Date/Time: 8/21/2024 4:35 PM    Performed by: Annetta Bui PA-C  Authorized by: Annetta Bui PA-C    Indications / Diagnosis:  Near syncope/syncope  ECG reviewed by me, the ED  Provider: yes    Patient location:  ED  Previous ECG:     Previous ECG:  Compared to current    Similarity:  No change  Interpretation:     Interpretation: normal    Rate:     ECG rate:  61    ECG rate assessment: normal    Rhythm:     Rhythm: sinus rhythm    QRS:     QRS intervals:  Normal  Conduction:     Conduction: normal    ST segments:     ST segments:  Normal  T waves:     T waves: normal    Other findings:     Other findings: early repolarization             ED Course               HEART Risk Score      Flowsheet Row Most Recent Value   Heart Score Risk Calculator    History 0 Filed at: 08/21/2024 1635   ECG 0 Filed at: 08/21/2024 1635   Age 0 Filed at: 08/21/2024 1635   Risk Factors 0 Filed at: 08/21/2024 1635   Troponin 0 Filed at: 08/21/2024 1635   HEART Score 0 Filed at: 08/21/2024 1635                          SBIRT 22yo+      Flowsheet Row Most Recent Value   Initial Alcohol Screen: US AUDIT-C     1. How often do you have a drink containing alcohol? 0 Filed at: 08/21/2024 1350   2. How many drinks containing alcohol do you have on a typical day you are drinking?  0 Filed at: 08/21/2024 1359   3b. FEMALE Any Age, or MALE 65+: How often do you have 4 or more drinks on one occassion? 0 Filed at: 08/21/2024 1359   Audit-C Score 0 Filed at: 08/21/2024 1350   IRVING: How many times in the past year have you...    Used an illegal drug or used a prescription medication for non-medical reasons? Never Filed at: 08/21/2024 1359            Wells' Criteria for PE      Flowsheet Row Most Recent Value   Wells' Criteria for PE    Clinical signs and symptoms of DVT 0 Filed at: 08/21/2024 1537   PE is primary diagnosis or equally likely 0 Filed at: 08/21/2024 1537   HR >100 0 Filed at: 08/21/2024 1537   Immobilization at least 3 days or Surgery in the previous 4 weeks 0 Filed at: 08/21/2024 1537   Previous, objectively diagnosed PE or DVT 0 Filed at: 08/21/2024 1537   Hemoptysis 0 Filed at: 08/21/2024 1537   Malignancy  with treatment within 6 months or palliative 0 Filed at: 08/21/2024 1537   Wells' Criteria Total 0 Filed at: 08/21/2024 1537                  Medical Decision Making  Differential diagnosis including but not limited to: vasovagal syncope, cardiac arrhythmia, MI, ACS, PE, metabolic abnormality, intracranial process, seizure, anemia, GI bleed, dehydration, ectopic pregnancy    Wells criteria negative.     Plan- will check basic labs, EKG, UA, preg, troponin.   Labs unremarkable. Preg negative.   TSH normal   EKG NSR without ST seg elevation/depression.   Troponin normal.   Doubt acs etiology.   Orthostatic vitals normal.    UA with 20-30 white blood cells.  Discussed all results with patient. Patient recently was treated for UTI with Keflex which she states she completed.  Urine culture from 7/31/2024 grew greater than 100,000 CFU's per mL of E. coli which was susceptible to Keflex.  Will send GC chlamydia as patient states she has had new sexual partners.  No vaginal complaints at this time.  Will cover with Macrobid as this was also susceptible on prior urine culture.     Explained that GC/chlamydia and urine culture results will take 3 to 4 days to result and she will be contacted with positive results or need for antibiotic change.    Discussed close follow-up with PCP.  Patient has had no further complaints or symptoms for the remainder of the day after her near syncopal episode this morning.  She is well-appearing, nontoxic and in no acute distress.  Vital signs stable.        The management plan was discussed in detail with the patient at bedside and all questions were answered.  Prior to discharge, we provided both verbal and written instructions.  We discussed with the patient the signs and symptoms for which to return to the emergency department.  All questions were answered and patient was comfortable with the plan of care and discharged to home.  Instructed the patient to follow up with the primary care  provider and/or specialist provided and their written instructions.  The patient verbalized understanding of our discussion and plan of care, and agrees to return to the Emergency Department for concerns and progression of illness.     At discharge, I instructed the patient to:  -follow up with pcp  -follow up with the recommended specialists  -return to the ER if symptoms worsened or new symptoms arose  Patient agreed to this plan and was stable at time of discharge.        Amount and/or Complexity of Data Reviewed  Labs: ordered. Decision-making details documented in ED Course.                 Disposition  Final diagnoses:   Vasovagal syncope   UTI (urinary tract infection)     Time reflects when diagnosis was documented in both MDM as applicable and the Disposition within this note       Time User Action Codes Description Comment    8/21/2024  4:07 PM Annetta Bui [R55] Vasovagal syncope     8/21/2024  4:16 PM Annetta Bui [N39.0] UTI (urinary tract infection)           ED Disposition       ED Disposition   Discharge    Condition   Stable    Date/Time   Wed Aug 21, 2024 1612    Comment   Bel Sanches discharge to home/self care.                   Follow-up Information       Follow up With Specialties Details Why Contact Info Additional Information    Mattie Chamberlain MD Family Medicine Schedule an appointment as soon as possible for a visit   [address] [place] [address]  [phone]       [facility] Emergency Department Emergency Medicine  If symptoms worsen [address] [place] [id number] [phone] [facility] Emergency Department, [address] [place], [place], [address]            Discharge Medication List as of 8/21/2024  4:19 PM        START taking these medications    Details   nitrofurantoin (MACROBID) 100 mg capsule Take 1 capsule (100 mg total) by mouth 2 (two) times a day for 7 days, Starting Wed 8/21/2024, Until Wed  8/28/2024, Normal           CONTINUE these medications which have NOT CHANGED    Details   phenazopyridine (PYRIDIUM) 200 mg tablet Take 1 tablet (200 mg total) by mouth 3 (three) times a day, Starting Wed 7/31/2024, Normal             No discharge procedures on file.    PDMP Review       None            ED Provider  Electronically Signed by             Annetta Bui PA-C  08/21/24 1134

## 2024-08-22 LAB
C TRACH DNA SPEC QL NAA+PROBE: POSITIVE
N GONORRHOEA DNA SPEC QL NAA+PROBE: NEGATIVE

## 2024-08-23 LAB — BACTERIA UR CULT: NORMAL

## 2024-09-25 ENCOUNTER — OFFICE VISIT (OUTPATIENT)
Dept: FAMILY MEDICINE CLINIC | Facility: CLINIC | Age: 24
End: 2024-09-25

## 2024-09-25 ENCOUNTER — OFFICE VISIT (OUTPATIENT)
Dept: OBGYN CLINIC | Facility: CLINIC | Age: 24
End: 2024-09-25

## 2024-09-25 VITALS
HEART RATE: 91 BPM | WEIGHT: 117 LBS | BODY MASS INDEX: 19.47 KG/M2 | SYSTOLIC BLOOD PRESSURE: 107 MMHG | DIASTOLIC BLOOD PRESSURE: 70 MMHG

## 2024-09-25 VITALS
RESPIRATION RATE: 16 BRPM | BODY MASS INDEX: 19.51 KG/M2 | HEART RATE: 105 BPM | HEIGHT: 65 IN | DIASTOLIC BLOOD PRESSURE: 72 MMHG | SYSTOLIC BLOOD PRESSURE: 104 MMHG | TEMPERATURE: 97.9 F | WEIGHT: 117.1 LBS | OXYGEN SATURATION: 99 %

## 2024-09-25 DIAGNOSIS — A74.9 CHLAMYDIA INFECTION: Primary | ICD-10-CM

## 2024-09-25 DIAGNOSIS — Z23 NEED FOR COVID-19 VACCINE: ICD-10-CM

## 2024-09-25 DIAGNOSIS — N92.6 MISSED MENSES: Primary | ICD-10-CM

## 2024-09-25 DIAGNOSIS — J06.9 VIRAL UPPER RESPIRATORY TRACT INFECTION: ICD-10-CM

## 2024-09-25 DIAGNOSIS — Z00.00 ANNUAL PHYSICAL EXAM: ICD-10-CM

## 2024-09-25 DIAGNOSIS — A64 STI (SEXUALLY TRANSMITTED INFECTION): ICD-10-CM

## 2024-09-25 DIAGNOSIS — Z23 ENCOUNTER FOR IMMUNIZATION: ICD-10-CM

## 2024-09-25 DIAGNOSIS — Z30.42 ENCOUNTER FOR SURVEILLANCE OF INJECTABLE CONTRACEPTIVE: ICD-10-CM

## 2024-09-25 DIAGNOSIS — Z11.59 NEED FOR HEPATITIS C SCREENING TEST: ICD-10-CM

## 2024-09-25 DIAGNOSIS — Z13.220 SCREENING FOR LIPID DISORDERS: ICD-10-CM

## 2024-09-25 DIAGNOSIS — Z72.0 TOBACCO ABUSE: ICD-10-CM

## 2024-09-25 DIAGNOSIS — Z11.4 SCREENING FOR HIV (HUMAN IMMUNODEFICIENCY VIRUS): ICD-10-CM

## 2024-09-25 DIAGNOSIS — Z11.3 ROUTINE SCREENING FOR STI (SEXUALLY TRANSMITTED INFECTION): ICD-10-CM

## 2024-09-25 LAB
SARS-COV-2 AG UPPER RESP QL IA: NEGATIVE
SL AMB POCT URINE HCG: NEGATIVE
VALID CONTROL: NORMAL

## 2024-09-25 PROCEDURE — 90656 IIV3 VACC NO PRSV 0.5 ML IM: CPT | Performed by: FAMILY MEDICINE

## 2024-09-25 PROCEDURE — 87491 CHLMYD TRACH DNA AMP PROBE: CPT | Performed by: FAMILY MEDICINE

## 2024-09-25 PROCEDURE — 90651 9VHPV VACCINE 2/3 DOSE IM: CPT | Performed by: FAMILY MEDICINE

## 2024-09-25 PROCEDURE — 99385 PREV VISIT NEW AGE 18-39: CPT | Performed by: FAMILY MEDICINE

## 2024-09-25 PROCEDURE — 90480 ADMN SARSCOV2 VAC 1/ONLY CMP: CPT | Performed by: FAMILY MEDICINE

## 2024-09-25 PROCEDURE — 81025 URINE PREGNANCY TEST: CPT | Performed by: FAMILY MEDICINE

## 2024-09-25 PROCEDURE — 87591 N.GONORRHOEAE DNA AMP PROB: CPT | Performed by: FAMILY MEDICINE

## 2024-09-25 PROCEDURE — 91320 SARSCV2 VAC 30MCG TRS-SUC IM: CPT | Performed by: FAMILY MEDICINE

## 2024-09-25 PROCEDURE — 90472 IMMUNIZATION ADMIN EACH ADD: CPT | Performed by: FAMILY MEDICINE

## 2024-09-25 PROCEDURE — 99202 OFFICE O/P NEW SF 15 MIN: CPT | Performed by: NURSE PRACTITIONER

## 2024-09-25 PROCEDURE — 99204 OFFICE O/P NEW MOD 45 MIN: CPT | Performed by: FAMILY MEDICINE

## 2024-09-25 PROCEDURE — 87811 SARS-COV-2 COVID19 W/OPTIC: CPT | Performed by: FAMILY MEDICINE

## 2024-09-25 PROCEDURE — 90471 IMMUNIZATION ADMIN: CPT | Performed by: FAMILY MEDICINE

## 2024-09-25 RX ORDER — MEDROXYPROGESTERONE ACETATE 150 MG/ML
150 INJECTION, SUSPENSION INTRAMUSCULAR
Qty: 1 ML | Refills: 5 | Status: SHIPPED | OUTPATIENT
Start: 2024-09-25

## 2024-09-25 RX ORDER — BROMPHENIRAMINE MALEATE, PSEUDOEPHEDRINE HYDROCHLORIDE, AND DEXTROMETHORPHAN HYDROBROMIDE 2; 30; 10 MG/5ML; MG/5ML; MG/5ML
5 SYRUP ORAL 4 TIMES DAILY PRN
Qty: 120 ML | Refills: 0 | Status: SHIPPED | OUTPATIENT
Start: 2024-09-25

## 2024-09-25 NOTE — ASSESSMENT & PLAN NOTE
Patient vapes and uses cigarettes quite frequently  She is not ready to quit smoking  Smoking cessation counseling provided

## 2024-09-25 NOTE — PROGRESS NOTES
Ambulatory Visit  Name: Bel Sanches      : 2000      MRN: [id number]  Encounter Provider: CARA Briseno  Encounter Date: 2024   Encounter department: [facility]    Assessment & Plan  Chlamydia infection    Orders:    Chlamydia/GC amplified DNA by PCR    Encounter for surveillance of injectable contraceptive    Orders:    medroxyPROGESTERone (DEPO-PROVERA) 150 mg/mL injection; Inject 1 mL (150 mg total) into a muscle every 3 (three) months    Plan  STD results are usually available in a few weeks  Remember safe sex and condom use  Reurn after 10/9/2024 for annual GYN exam and next Depoprovera  Pt verbalized understanding of all discussed.      History of Present Illness     Bel Sanches is a 24 y.o. female who presents for a test of cure for Chlamydia  Pt tested positive for Chlamydia 2024, pt states her and her partner wer treated  Pt also had Depoprovera on 2024  Pt states she has never had a PAP  HPV vaccines was 2024    Depression Screening Follow-up Plan: Patient's depression screening was negative with a PHQ-2 score of 0. Their PHQ-2 score was ). Clinically patient does not have depression. No treatment is required.        Review of Systems  .Pertinent items are note in the HPI          Objective     /70 (BP Location: Right arm, Patient Position: Sitting, Cuff Size: Standard)   Pulse 91   Wt 53.1 kg (117 lb)   LMP 2024 (Exact Date)   BMI 19.47 kg/m²     Physical Exam  Vitals reviewed.   Constitutional:       Appearance: Normal appearance.   Eyes:      General:         Right eye: No discharge.         Left eye: No discharge.   Pulmonary:      Effort: Pulmonary effort is normal. No respiratory distress.   Musculoskeletal:         General: Normal range of motion.      Cervical back: Normal range of motion.   Neurological:      Mental Status: She is alert and oriented to person, place, and time.   Psychiatric:          Mood and Affect: Mood normal.         Behavior: Behavior normal.         Thought Content: Thought content normal.     Negative cough or SOB

## 2024-09-25 NOTE — PROGRESS NOTES
Adult Annual Physical  Name: Bel Sanches      : 2000      MRN: [id number]  Encounter Provider: Yo Rain MD  Encounter Date: 2024   Encounter department: [facility]    Assessment & Plan  Missed menses  Patient recently missed her period  Office pregnancy test negative  Continue Depo injection by GYN  Follow-up with GYN    Orders:    POCT urine HCG    Routine screening for STI (sexually transmitted infection)    Orders:    Chlamydia/GC amplified DNA by PCR; Future    Chlamydia/GC amplified DNA by PCR    Hepatitis B surface antigen; Future    RPR-Syphilis Screening (Total Syphilis IGG/IGM); Future    Need for hepatitis C screening test    Orders:    Hepatitis C Antibody; Future    Screening for HIV (human immunodeficiency virus)    Orders:    HIV 1/2 AG/AB w Reflex [facility] for 2 yr old and above; Future    Encounter for immunization    Orders:    influenza vaccine preservative-free 0.5 mL IM (Fluzone, Afluria, Fluarix, Flulaval)    HPV VACCINE 9 VALENT IM    Need for COVID-19 vaccine    Orders:    COVID-19 Pfizer mRNA vaccine 12 yr and older (Comirnaty pre-filled syringe)    Screening for lipid disorders    Orders:    Lipid panel; Future    Viral upper respiratory tract infection  A few day onset of upper respiratory symptoms  Rapid COVID-negative  Recommend supportive care  Orders:    POCT Rapid Covid Ag    brompheniramine-pseudoephedrine-DM 30-2-10 MG/5ML syrup; Take 5 mL by mouth 4 (four) times a day as needed for cough or congestion    Annual physical exam         STI (sexually transmitted infection)  Recently diagnosed with chlamydia  Patient stated that her and her partner  has been treated with antibiotics for chlamydia.  Chart review only showed prescription sent for urinary tract infection.  I do not see any prescription sent for chlamydia infection.  Recheck gonorrhea and chlamydia  Safe sex counseling provided to patient         Tobacco  abuse  Patient vapes and uses cigarettes quite frequently  She is not ready to quit smoking  Smoking cessation counseling provided         Immunizations and preventive care screenings were discussed with patient today. Appropriate education was printed on patient's after visit summary.    Counseling:  Dental Health: discussed importance of regular tooth brushing, flossing, and dental visits.  Sexual health: discussed sexually transmitted diseases, partner selection, use of condoms, avoidance of unintended pregnancy, and contraceptive alternatives.  Exercise: the importance of regular exercise/physical activity was discussed. Recommend exercise 3-5 times per week for at least 30 minutes.       Depression Screening and Follow-up Plan: Patient was screened for depression during today's encounter. They screened negative with a PHQ-2 score of 0.    Tobacco Cessation Counseling: Tobacco cessation counseling was provided. The patient is sincerely urged to quit consumption of tobacco. She is not ready to quit tobacco.         History of Present Illness     Adult Annual Physical:  Patient presents for annual physical. Patient refuses professional interpretation services.  Her mother-in-law provided translation for her    24-year-old female with a history of tobacco abuse who presents today for annual wellness visit.  Patient has not had her period in the past couple months.  She was previously getting Depo injection for contraception.  She stated that she tested positive for chlamydia.  She stated that she was prescribed antibiotics which she has completed.  She states that her sexual partner has also completed treatment.  She stated that she was told by healthcare provider that her recent antibiotics may affect the efficacy of her Depo injection.  Patient would like to get tested for pregnancy.  Patient denies any symptoms of pregnancy such as breast enlargement or morning sickness.  She denies any dysuria, urinary urgency,  "hematuria, or vaginal discharge.  Patient also reports a few day onset of upper respiratory symptoms.  She reports dry cough, nasal congestion, and sinus pressure.  No recent sick contact.  She is afebrile.  She is eating and drinking okay..     Diet and Physical Activity:  - Diet/Nutrition: well balanced diet.  - Exercise: walking.    Depression Screening:  - PHQ-2 Score: 0    General Health:  - Sleep: sleeps well.  - Hearing: normal hearing bilateral ears.  - Vision: wears glasses.  - Dental: brushes teeth once daily.    /GYN Health:  - Follows with GYN: yes.   - Menopause: premenopausal.   - History of STDs: yes  - Contraception: injectable contraception.      Review of Systems   Constitutional:  Negative for chills, diaphoresis, fatigue and fever.   HENT:  Positive for congestion, rhinorrhea and sinus pressure. Negative for ear pain, postnasal drip and sinus pain.    Eyes:  Negative for visual disturbance.   Respiratory:  Positive for cough. Negative for choking, shortness of breath and wheezing.    Cardiovascular:  Negative for chest pain, palpitations and leg swelling.   Gastrointestinal:  Negative for abdominal pain, constipation, diarrhea, nausea and vomiting.   Endocrine: Negative for polydipsia, polyphagia and polyuria.   Genitourinary:  Positive for menstrual problem. Negative for dysuria, flank pain, frequency, hematuria, urgency, vaginal bleeding, vaginal discharge and vaginal pain.   Musculoskeletal:  Negative for arthralgias and gait problem.   Skin:  Negative for rash.   Neurological:  Negative for dizziness, syncope, weakness, numbness and headaches.   Hematological:  Negative for adenopathy.   Psychiatric/Behavioral:  Negative for confusion.          Objective     /72 (BP Location: Left arm, Patient Position: Sitting, Cuff Size: Standard)   Pulse 105   Temp 97.9 °F (36.6 °C) (Temporal)   Resp 16   Ht 5' 5\" (1.651 m)   Wt 53.1 kg (117 lb 1.6 oz)   SpO2 99%   BMI 19.49 kg/m² "     Physical Exam  Constitutional:       General: She is not in acute distress.     Appearance: Normal appearance. She is well-developed. She is not ill-appearing, toxic-appearing or diaphoretic.   HENT:      Head: Normocephalic and atraumatic.      Right Ear: Tympanic membrane, ear canal and external ear normal. There is no impacted cerumen.      Left Ear: Tympanic membrane, ear canal and external ear normal. There is no impacted cerumen.      Nose: Nose normal. No congestion.      Mouth/Throat:      Pharynx: No oropharyngeal exudate.   Eyes:      General:         Right eye: No discharge.         Left eye: No discharge.      Pupils: Pupils are equal, round, and reactive to light.   Cardiovascular:      Rate and Rhythm: Normal rate and regular rhythm.      Heart sounds: Normal heart sounds. No murmur heard.     No friction rub. No gallop.   Pulmonary:      Effort: Pulmonary effort is normal. No respiratory distress.      Breath sounds: Normal breath sounds. No stridor. No wheezing or rhonchi.   Abdominal:      General: Bowel sounds are normal. There is no distension.      Palpations: Abdomen is soft. There is no mass.      Tenderness: There is no abdominal tenderness. There is no guarding.      Hernia: No hernia is present.   Musculoskeletal:         General: Normal range of motion.      Cervical back: Normal range of motion.   Lymphadenopathy:      Cervical: No cervical adenopathy.   Skin:     General: Skin is warm.      Capillary Refill: Capillary refill takes less than 2 seconds.   Neurological:      General: No focal deficit present.      Mental Status: She is alert and oriented to person, place, and time.      Cranial Nerves: No cranial nerve deficit.      Motor: No weakness.      Gait: Gait normal.   Psychiatric:         Mood and Affect: Mood normal.         Behavior: Behavior normal.

## 2024-09-25 NOTE — PATIENT INSTRUCTIONS
"Patient Education     Routine physical for adults   The Basics   Written by the doctors and editors at [facility]   What is a physical? -- A physical is a routine visit, or \"check-up,\" with your doctor. You might also hear it called a \"wellness visit\" or \"preventive visit.\"  During each visit, the doctor will:   Ask about your physical and mental health   Ask about your habits, behaviors, and lifestyle   Do an exam   Give you vaccines if needed   Talk to you about any medicines you take   Give advice about your health   Answer your questions  Getting regular check-ups is an important part of taking care of your health. It can help your doctor find and treat any problems you have. But it's also important for preventing health problems.  A routine physical is different from a \"sick visit.\" A sick visit is when you see a doctor because of a health concern or problem. Since physicals are scheduled ahead of time, you can think about what you want to ask the doctor.  How often should I get a physical? -- It depends on your age and health. In general, for people age 21 years and older:   If you are younger than 50 years, you might be able to get a physical every 3 years.   If you are 50 years or older, your doctor might recommend a physical every year.  If you have an ongoing health condition, like diabetes or high blood pressure, your doctor will probably want to see you more often.  What happens during a physical? -- In general, each visit will include:   Physical exam - The doctor or nurse will check your height, weight, heart rate, and blood pressure. They will also look at your eyes and ears. They will ask about how you are feeling and whether you have any symptoms that bother you.   Medicines - It's a good idea to bring a list of all the medicines you take to each doctor visit. Your doctor will talk to you about your medicines and answer any questions. Tell them if you are having any side effects that bother you. You " "should also tell them if you are having trouble paying for any of your medicines.   Habits and behaviors - This includes:   Your diet   Your exercise habits   Whether you smoke, drink alcohol, or use drugs   Whether you are sexually active   Whether you feel safe at home  Your doctor will talk to you about things you can do to improve your health and lower your risk of health problems. They will also offer help and support. For example, if you want to quit smoking, they can give you advice and might prescribe medicines. If you want to improve your diet or get more physical activity, they can help you with this, too.   Lab tests, if needed - The tests you get will depend on your age and situation. For example, your doctor might want to check your:   Cholesterol   Blood sugar   Iron level   Vaccines - The recommended vaccines will depend on your age, health, and what vaccines you already had. Vaccines are very important because they can prevent certain serious or deadly infections.   Discussion of screening - \"Screening\" means checking for diseases or other health problems before they cause symptoms. Your doctor can recommend screening based on your age, risk, and preferences. This might include tests to check for:   Cancer, such as breast, prostate, cervical, ovarian, colorectal, prostate, lung, or skin cancer   Sexually transmitted infections, such as chlamydia and gonorrhea   Mental health conditions like depression and anxiety  Your doctor will talk to you about the different types of screening tests. They can help you decide which screenings to have. They can also explain what the results might mean.   Answering questions - The physical is a good time to ask the doctor or nurse questions about your health. If needed, they can refer you to other doctors or specialists, too.  Adults older than 65 years often need other care, too. As you get older, your doctor will talk to you about:   How to prevent falling at " home   Hearing or vision tests   Memory testing   How to take your medicines safely   Making sure that you have the help and support you need at home  All topics are updated as new evidence becomes available and our peer review process is complete.  This topic retrieved from [employer] on: May 02, 2024.  Topic 762296 Version 1.0  Release: 32.4.3 - C32.122  © 2024 UpToDate, Inc. and/or its affiliates. All rights reserved.  Consumer Information Use and Disclaimer   Disclaimer: This generalized information is a limited summary of diagnosis, treatment, and/or medication information. It is not meant to be comprehensive and should be used as a tool to help the user understand and/or assess potential diagnostic and treatment options. It does NOT include all information about conditions, treatments, medications, side effects, or risks that may apply to a specific patient. It is not intended to be medical advice or a substitute for the medical advice, diagnosis, or treatment of a health care provider based on the health care provider's examination and assessment of a patient's specific and unique circumstances. Patients must speak with a health care provider for complete information about their health, medical questions, and treatment options, including any risks or benefits regarding use of medications. This information does not endorse any treatments or medications as safe, effective, or approved for treating a specific patient. UpToDate, Inc. and its affiliates disclaim any warranty or liability relating to this information or the use thereof.The use of this information is governed by the Terms of Use, available at https://www.[employer].uwer.com/en/know/clinical-effectiveness-terms. 2024© UpToDate, Inc. and its affiliates and/or licensors. All rights reserved.  Copyright   © 2024 UpToDate, Inc. and/or its affiliates. All rights reserved.    Patient Education     Dejar de fumar   Conceptos Básicos   Redactado por los médicos  y editores de [employer]   ¿Cuáles son los beneficios de dejar de fumar? -- Dejar de fumar puede mejorar radicalmente moreno jeremi y ayudarlo a vivir más. Disminuye moreno riesgo de enfermedad cardíaca, enfermedad pulmonar, insuficiencia renal, cáncer, infección, problemas estomacales y diabetes.  Dejar de fumar también puede disminuir las probabilidades de desarrollar osteoporosis, un padecimiento que debilita los huesos.  Para la mayoría de las personas dejar de fumar no es fácil, y es posible que lo intenten varias veces hasta lograrlo. Sin embargo, disponen de ayuda y recursos. Dejar de fumar mejorará moreno jeremi independientemente de moreno edad, incluso si hace mucho tiempo que fuma.  ¿Qué beatriz hacer si quiero dejar de fumar? -- Para comenzar, es conveniente que hable con moreno médico o enfermero. Es posible dejar de fumar por moreno cuenta y sin ayuda, candi contar con ayuda aumenta considerablemente las posibilidades de lograrlo.  Cuando esté listo para dejar de fumar, creará un plan que incluya lo siguiente:   Decidir qué día va a dejar de fumar.   Informar a chelsea familiares y amigos que tiene planeado dejar de fumar.   Pensar con anterioridad cómo superará los momentos difíciles que va a enfrentar, por ejemplo las ansias de fumar.   Desechar los cigarrillos que haya en moreno casa, auto y lugar de trabajo.  ¿Cómo me puede ayudar mi médico o enfermero? -- Moreno médico o enfermero puede darle consejos sobre la mejor manera de dejar de fumar. También puede darle medicinas que radha lo siguiente:   Disminuir chelsea ganas de fumar cigarrillos   Disminuir los síntomas de “abstinencia” (síntomas que aparecen cuando se aron de fumar)  El médico o enfermero también puede ayudarlo a encontrar un terapeuta con quien conversar. Para la mayoría de las personas que intentan dejar de fumar, lo más efectivo es usar medicinas y ade terapia psicológica.  También puede recibir ayuda llamando a ade línea de teléfono gratuita ([phone], o [phone]) o  ingresando al sitio en línea www.smokefree.gov.  ¿Cuáles son los síntomas de la abstinencia? -- Al dejar de fumar, podría tener síntomas simon los siguientes:   Dificultad para dormir   Sentimientos de irritabilidad, ansiedad o inquietud   Frustración o enojo   Problemas para pensar con claridad  Estos síntomas pueden ser difíciles de sobrellevar. Por eso dejar de fumar a veces es tan difícil, candi las medicinas pueden ayudar.  Algunas personas que alberto de fumar se deprimen por un tiempo. Algunas de ellas necesitan un tratamiento para la depresión, simon ade terapia psicológica, medicinas, o las dos cosas. Las personas que tienen depresión podrían:   No disfrutar ni interesarse por hacer las cosas que antes les gustaban   Estar tristes, deprimidas, desesperanzadas, nerviosas o malhumoradas la mayor parte del día, rl todos los días   Subir o bajar de peso   Duerma demasiado o muy poco   Sentirse cansadas o sin energía   Sentirse culpables o sentir que no george nada   Olvidar cosas o sentir confusión   Moverse y hablar más lento de lo normal   Actuar de forma inquieta o tener dificultad para quedarse quietas   Pensar en la muerte o el suicidio  Si piensa que puede estar deprimido, dígaselo de inmediato a moreno médico o enfermero, quien podrá conversar con usted acerca de chelsea síntomas y recomendarle un tratamiento si es necesario.  ¡Pida ayuda de inmediato si está pensando en lastimarse o suicidarse! -- A veces, las personas que tienen depresión piensan en lastimarse o quitarse la teja. Si alguna vez siente que podría hacerse daño, puede obtener ayuda:   En [facility] comuníquese con la Línea de Prevención del Suicidio y Crisis 988:   Para hablar con alguien, envíe un mensaje de texto o llame al 988.   Para hablar con alguien en línea, visite [email or web address] [email or web address].   Llame a moreno médico o enfermero y dígale que tiene ade emergencia.   Pida ade ambulancia (en [facility]. y [place], llame al 9-1-1).   Vaya al departamento de  emergencias de moreno hospital local.  Si piensa que moreno chuck podría tener depresión, o le preocupa que se lastime, obtenga ayuda de inmediato.  ¿En qué consiste la terapia psicológica? -- Un terapeuta puede ayudarlo a determinar:   Qué desencadena moreno deseo de fumar, y cómo manejar esas situaciones   Cómo resistir las ansias de fumar   Qué cambios puede hacer si ya intentó dejar de fumar en el pasado  Puede consultar al terapeuta en sesiones individuales o grupales. Además, existen otras formas de recibir terapia: por ejemplo, por vía telefónica, por mensaje de texto o en línea.  ¿Cómo ayudan las medicinas a dejar de fumar? -- Cada medicina funciona de diferente forma:   Terapia de reemplazo de nicotina - La nicotina es la principal droga de los cigarrillos, y la razón por la cual son adictivos. Estas medicinas reducen las ansias de nicotina del cuerpo. También ayudan con los síntomas de abstinencia.  Existen distintos tipos de reemplazo de nicotina , simon parches para la piel, pastillas, goma de mascar, sprays nasales e inhaladores. La mayoría de ellos se consigue sin receta. Además, el seguro de jeremi podría cubrir el costo total o parcialmente.  En general es útil usar dos formas de reemplazo de nicotina . Por ejemplo, podría usar un parche en todo momento y chicles o pastillas cuando sienta ansias de fumar.   Vareniclina - La vareniclina (marcas comerciales: Chantix, Champix) es ade medicina de venta con receta que disminuye los síntomas de abstinencia y las ansias de fumar. La vareniclina puede intensificar los efectos del alcohol en algunas personas. Es conveniente limitar el consumo de alcohol mientras la tome, al menos hasta que sepa cómo lo afecta.  Incluso si no está listo para decidir qué día va a dejar de fumar, la vareniclina puede ayudar a disminuir las ansias de fumar, lo que a moreno vez puede hacer que le resulte más fácil dejar de fumar cuando esté listo.   Bupropión - El bupropión (ejemplos de marcas  comerciales: Zyban, Wellbutrin) es ade medicina de venta con receta que disminuye torres deseo de fumar. También se vende en versión genérica, que es más barata que las medicinas de emmanuel. Generalmente, los médicos no recetan bupropión a personas que tienen o bañuelos tenido crisis neurológicas.  También podría ser útil combinar el remplazo de nicotina con bupropión o vareniclina. En algunos casos, incluso es posible usar bupropión y vareniclina. Torres médico o enfermero puede ayudarlo a determinar cuál es la combinación más adecuada para usted.  ¿Y los cigarrillos electrónicos? -- Algunas personas se preguntan si usar cigarrillos electrónicos puede ayudarlas a dejar de fumar. Los cigarrillos electrónicos también se llaman “vaporizadores electrónicos”. Los médicos no recomiendan usar cigarrillos electrónicos en lugar de usar medicinas o terapia psicológica, ya que los cigarrillos electrónicos también contienen nicotina y otras sustancias que podrían ser dañinas. Además, no está cuca cómo pueden afectar la jeremi a nikita plazo.  ¿Qué sucede si estoy en embarazo y fumo? -- Si está en embarazo, es muy importante para la jeremi de torres bebé que deje de fumar. Pregúntele a torres médico qué opciones tiene y cuál es la más hansen para torres bebé.  ¿Qué sucede si fumo desde hace mucho tiempo? -- Mientras más tiempo haya fumado, más altas son chelsea probabilidades de tener problemas de jeremi. Carmenza nunca es demasiado tarde para dejar de fumar. Es chavis para torres jeremi incluso si es mayor o hace muchos años que fuma. Lo mejor que puede hacer para disminuir chelsea probabilidades de tener un problema de jeremi a causa de fumar es dejar de hacerlo.  ¿Subiré de peso al dejar de fumar? -- Es posible que aumente unas libras. [place] puede causarles frustración a algunas personas, carmenza es importante que recuerde que torres jeremi mejorará si aron de fumar. Pat ayuda para no subir mucho de peso, puede mantenerse activo y tener ade alimentación saludable.  ¿Qué sucede si  no puedo dejar de fumar? -- Si no aron de fumar en el primer intento, o si aron de fumar candi luego retoma el hábito, no pierda las esperanzas. Muchas personas deben intentarlo más de ade vez hasta conseguir dejar de fumar por completo.  Podría ser de ayuda que trate de entender por qué moreno intento de dejar de fumar no misa resultado. Quizás haya algo que pueda cambiar la próxima vez que lo intente. Puede ser útil que determine cuáles son las situaciones que le causan deseos de fumar, para poder evitarlas.  ¿Qué más puedo hacer para aumentar mis probabilidades de dejar de fumar? -- Puede hacer lo siguiente:   Jaya ejercicio regularmente. Cualquier tipo de actividad física, incluso si se trata de movimientos leves, es buena para la jeremi. La actividad física también puede ayudar a disminuir el estrés.   Evite exponerse a personas que fuman y lugares que le producen deseos de fumar. Si tiene personas cercanas que fuman, pídales que dejen de fumar al mismo tiempo que usted o que no fumen en moreno presencia.   Tenga con usted chicles, caramelos duros o algo para llevarse a la boca. Si tiene ansiedad por fumarse un cigarrillo, pruebe calmarla con agustin de estos elementos en moreno lugar.   No se rinda, incluso si vuelve a fumar. La mayoría de la gente intenta varias veces hasta que lo logra.  Todos los artículos se actualizan a medida que se descubre nueva evidencia y culmina nuestro proceso de evaluación por homólogos   Margaret artículo se recuperó de UpToDate el: Mar 14, 2024.  Artículo 50604 Versión [id number].[id number]  Release: 32.2.4 - C32.72  © 2024 [facility] Todos los derechos reservados.  Exención de responsabilidad y uso de la información del consumidor   Descargo de responsabilidad: esta información generalizada es un resumen limitado de información sobre el diagnóstico, el tratamiento y/o los medicamentos. No pretende ser exhaustiva y se debe utilizar simon herramienta para ayudar al usuario a comprender y/o evaluar las  "posibles opciones de diagnóstico y tratamiento. No incluye toda la información sobre afecciones, tratamientos, medicamentos, efectos secundarios o riesgos puedan ser aplicables a un paciente específico. No tiene el propósito de servir simon recomendación médica ni de sustituir la recomendación médica, el diagnóstico o el tratamiento de un profesional de atención médica que se base en el examen y la evaluación de merlin profesional de la jeremi respecto a las circunstancias específicas y únicas del paciente. Los pacientes deben hablar con un profesional de atención médica para obtener información completa sobre moreno jeremi, cuestiones médicas y opciones de tratamiento, incluidos los riesgos o los beneficios relacionados con el uso de medicamentos. Esta información no certifica que los tratamientos o medicamentos earline seguros, eficaces o estén aprobados para tratar a un paciente específico. [employer] y chelsea afiliados renuncian a cualquier garantía o responsabilidad relacionada con esta información o el uso de la misma.El uso de esta información está sujeto a las Condiciones de uso, disponibles en https://www.Mobstatser.com/en/know/clinical-effectiveness-terms. 2024© [employer] y chelsea afiliados y/o licenciantes. Todos los derechos reservados.  Copyright   © 2024 [employer] Todos los derechos reservados.    Patient Education     Vaporizadores electrónicos   Conceptos Básicos   Redactado por los médicos y editores de [employer]   ¿Qué son los vaporizadores electrónicos? -- Los \"vaporizadores electrónicos\" son otra manera de denominar a los cigarrillos electrónicos. Son dispositivos dotados de ade pequeña pila que calienta un líquido, lo que crea un vapor o aerosol. Luego, el usuario inhala miguel vapor, similarmente al modo en que inhalaría el humo de un cigarrillo común (de tabaco).  Existen muchos tipos diferentes de cigarrillos electrónicos. Algunos tienen aproximadamente el mismo tamaño y la misma forma que un " "bolígrafo, mientras que otros son más pequeños o tienen otra forma. Algunos son más grandes y tienen cartuchos de \"líquido de vaporizador\" reemplazables. También pueden tener diseños similares a los de otros artículos, simon unidades flash USB (figura 1).  ¿Los vaporizadores electrónicos son más seguros que los cigarrillos comunes? -- Martir vez. Los expertos aún no disponen de mucha información sobre los efectos a nikita plazo que los vaporizadores electrónicos tienen para la jeremi. Sin embargo, la mayoría de los cigarrillos electrónicos y líquidos vaporizadores contienen nicotina, el ingrediente que hace que los cigarrillos comunes earline oliveros adictivos.  Aunque muchos cigarrillos electrónicos puedan contener menos nicotina que los cigarrillos comunes, eso no quiere decir que earline inofensivos. Además de los posibles efectos para la jeremi, el uso de vaporizadores electrónicos puede causar adicción a la nicotina, lo que podría aumentar las posibilidades de comenzar a fumar cigarrillos comunes. Se sabe que fumar tabaco aumenta el riesgo de tener graves problemas de jeremi, simon enfermedad cardíaca, enfermedad pulmonar, insuficiencia renal, infección y cáncer.  Además, los cigarrillos electrónicos pueden contener otras sustancias dañinas, simon pequeñas cantidades de metales y sustancias químicas. También pueden contener otros ingredientes cuyos efectos para la jeremi se desconocen.  ¿Qué problemas pueden causar los vaporizadores electrónicos? -- Los cigarrillos electrónicos se volvieron populares hace poco, por lo que los expertos aún no disponen de mucha información sobre cómo podrían afectar la jereim con el paso del tiempo. Sin embargo, existen pruebas de que pueden causar problemas simon los siguientes:   Dependencia de nicotina - La nicotina, que está presente en la mayoría de los cigarrillos electrónicos, es adictiva. Incluso quienes solo usan un vaporizador electrónico ocasionalmente podrían sentir ansias de usarlo cada " vez más. Las personas que fuman muchos cigarrillos electrónicos pueden tener niveles de nicotina en el cuerpo similares a los de las personas que fuman cigarrillos comunes.   Síntomas respiratorios - Algunas personas que usan vaporizadores electrónicos notan síntomas similares a los de la bronquitis. Se debe a ade irritación de los bronquios, que son los tubos que transportan el aire que entra y sale de los pulmones. Algunos de los síntomas pueden ser tos con o sin moco.   Daño pulmonar - Ha habido casos de daño pulmonar en personas que usan vaporizadores electrónicos. El riesgo parece ser mayor cuando las personas usan vaporizadores electrónicos que se bañuelos rellenado con otras drogas, simon el THC (el ingrediente activo del cánnabis). El riesgo también aumenta cuando el líquido vaporizador contiene ingredientes adicionales, simon vitamina E. Inhalar el vapor puede provocar síntomas simon tos, dolor en el pecho y dificultad para respirar. En algunos casos, el daño pulmonar puede ser grave.   Quemaduras - Los vaporizadores electrónicos pueden causar quemaduras u otras lesiones.   Intoxicación por nicotina - El líquido utilizado en los cigarrillos electrónicos contiene grandes cantidades de nicotina. Beber merlin líquido puede provocar ade intoxicación grave y la muerte. Es muy importante mantener los cigarrillos electrónicos y las cápsulas de líquido para vapear fuera del alcance de los niños pequeños.  ¿Cuáles son los riesgos de los vaporizadores electrónicos para los jóvenes? -- El riesgo principal es que los jóvenes que usan vaporizadores electrónicos son más propensos a volverse adictos a la nicotina, la cual puede ser dañina para el cerebro en desarrollo de las personas jóvenes. Además, el uso de vaporizadores electrónicos aumenta el riesgo de comenzar a fumar cigarrillos comunes, lo que puede ocasionar graves problemas de jeremi.  Muchas empresas que fabrican cigarrillos electrónicos y otros dispositivos dirigen moreno  comercialización directamente a los niños y adolescentes. Por ejemplo, venden productos con distintos sabores para atraer a los jóvenes. La presión grupal también puede ser un factor, ya que los jóvenes suelen querer probar las cosas que hacen chelsea amigos.  Si vive con niños pequeños o adolescentes o tiene hijos de esas edades, es importante que tenga consciencia de que es probable que escuchen acerca de los vaporizadores electrónicos o conozcan gente que los usa. Conozca el aspecto de los distintos dispositivos y hable abiertamente sobre los riesgos con los niños y adolescentes. Si no disponen de información precisa, podrían creer que los vaporizadores electrónicos son inofensivos.  ¿Los vaporizadores electrónicos pueden ayudarme a dejar de fumar? -- A veces, las personas que ya fuman cigarrillos se preguntan si usar vaporizadores electrónicos puede ayudarlas a dejar de fumar.  Los médicos recomiendan el uso de medicinas y hacer terapia para dejar de fumar. Estos métodos son los que más se bañuelos estudiado. Sin embargo, para las personas que los bañuelos probado y no bañuelos podido dejar de fumar, pasar a usar vaporizadores electrónicos podría ser ade opción. Hay que tener en cuenta algunas cosas:   Es posible que los vaporizadores electrónicos earline menos dañinos que los cigarrillos comunes, candi los cigarrillos electrónicos también contienen nicotina, así simon otras sustancias que pueden ser perjudiciales.   Si decide usar vaporizadores electrónicos para dejar de fumar, es importante que pase completamente de los cigarrillos comunes a los cigarrillos electrónicos. Es probable que usar ambos tipos de cigarrillos no sea de ayuda, y podría ser más perjudicial.   No está cuca cómo los vaporizadores electrónicos pueden afectar la jeremi a nikita plazo.  Por estas razones, los médicos recomiendan que si prueba a usar vaporizadores electrónicos para dejar de fumar, kuldeep un plan para dejar de usarlos en el futuro.  Si le interesa dejar  "de fumar, moreno médico o enfermero puede ayudarlo a crear un plan. Dejar de fumar no es fácil, candi es ade de las mejores decisiones que puede chyna para moreno jeremi.  ¿Qué ocurre si necesito ayuda para dejar de usar vaporizadores electrónicos? -- Si quiere dejar de usar vaporizadores electrónicos, hable con moreno médico o enfermero, quien puede ayudarlo a dejar los cigarrillos electrónicos con medicinas o terapia, igual que simon se alberto los cigarrillos comunes.  Todos los artículos se actualizan a medida que se descubre nueva evidencia y culmina nuestro proceso de evaluación por homólogos   Merlin artículo se recuperó de UpToDate el: Apr 17, 2024.  Artículo [id number] Versión 3.0.es-419.1  Release: 32.3.2 - C32.106  © 2024 [facility] Todos los derechos reservados.  figura 1: Vaporizadores electrónicos     Existen muchos tipos de \"cigarrillos electrónicos\" y otros vaporizadores. Algunos se parecen a los cigarrillos reales, mientras que otros se asemejan a bolígrafos u otros objetos. En merlin gráfico se muestran algunos ejemplos.  Reproducido de: Centros para el Control y la Prevención de Enfermedades. Datos breves sobre el riesgo de los cigarrillos electrónicos para niños, adolescentes y adultos jóvenes. Disponible en: https://www.cdc.gov/tobacco/basic_information/e-cigarettes/Quick-Facts-on-the-Risks-of-E-cigarettes-for-Kids-Teens-and-Young-Adults.html (Consultado el 9 de agosto de 2021).  Gráfico [id number] Versión 1.0  Exención de responsabilidad y uso de la información del consumidor   Descargo de responsabilidad: esta información generalizada es un resumen limitado de información sobre el diagnóstico, el tratamiento y/o los medicamentos. No pretende ser exhaustiva y se debe utilizar simon herramienta para ayudar al usuario a comprender y/o evaluar las posibles opciones de diagnóstico y tratamiento. No incluye toda la información sobre afecciones, tratamientos, medicamentos, efectos secundarios o riesgos puedan ser aplicables a " un paciente específico. No tiene el propósito de servir simon recomendación médica ni de sustituir la recomendación médica, el diagnóstico o el tratamiento de un profesional de atención médica que se base en el examen y la evaluación de merlin profesional de la jeremi respecto a las circunstancias específicas y únicas del paciente. Los pacientes deben hablar con un profesional de atención médica para obtener información completa sobre moreno jeremi, cuestiones médicas y opciones de tratamiento, incluidos los riesgos o los beneficios relacionados con el uso de medicamentos. Esta información no certifica que los tratamientos o medicamentos earline seguros, eficaces o estén aprobados para tratar a un paciente específico. [employer] y chelsea afiliados renuncian a cualquier garantía o responsabilidad relacionada con esta información o el uso de la misma.El uso de esta información está sujeto a las Condiciones de uso, disponibles en https://www.[employer].com/en/know/clinical-effectiveness-terms. 2024© [employer] y chelsea afiliados y/o licenciantes. Todos los derechos reservados.  Copyright   © 2024 [employer] Todos los derechos reservados.

## 2024-09-25 NOTE — PATIENT INSTRUCTIONS
STD results are usually available in a few weeks  Remember safe sex and condom use  Reurn after 10/9/2024 for annual GYN exam and next Depoprovera

## 2024-09-26 LAB
C TRACH DNA SPEC QL NAA+PROBE: NEGATIVE
N GONORRHOEA DNA SPEC QL NAA+PROBE: NEGATIVE

## 2024-09-28 ENCOUNTER — APPOINTMENT (OUTPATIENT)
Dept: LAB | Facility: HOSPITAL | Age: 24
End: 2024-09-28
Payer: COMMERCIAL

## 2024-09-28 DIAGNOSIS — Z11.59 NEED FOR HEPATITIS C SCREENING TEST: ICD-10-CM

## 2024-09-28 DIAGNOSIS — Z11.3 ROUTINE SCREENING FOR STI (SEXUALLY TRANSMITTED INFECTION): ICD-10-CM

## 2024-09-28 DIAGNOSIS — Z13.220 SCREENING FOR LIPID DISORDERS: ICD-10-CM

## 2024-09-28 DIAGNOSIS — Z11.4 SCREENING FOR HIV (HUMAN IMMUNODEFICIENCY VIRUS): ICD-10-CM

## 2024-09-28 LAB
CHOLEST SERPL-MCNC: 179 MG/DL
HDLC SERPL-MCNC: 57 MG/DL
LDLC SERPL CALC-MCNC: 107 MG/DL (ref 0–100)
NONHDLC SERPL-MCNC: 122 MG/DL
TRIGL SERPL-MCNC: 73 MG/DL

## 2024-09-28 PROCEDURE — 86780 TREPONEMA PALLIDUM: CPT

## 2024-09-28 PROCEDURE — 86803 HEPATITIS C AB TEST: CPT

## 2024-09-28 PROCEDURE — 36415 COLL VENOUS BLD VENIPUNCTURE: CPT

## 2024-09-28 PROCEDURE — 80061 LIPID PANEL: CPT

## 2024-09-28 PROCEDURE — 87389 HIV-1 AG W/HIV-1&-2 AB AG IA: CPT

## 2024-09-28 PROCEDURE — 87340 HEPATITIS B SURFACE AG IA: CPT

## 2024-09-29 LAB
HBV SURFACE AG SER QL: NORMAL
HCV AB SER QL: NORMAL
HIV 1+2 AB+HIV1 P24 AG SERPL QL IA: NORMAL
HIV 2 AB SERPL QL IA: NORMAL
HIV1 AB SERPL QL IA: NORMAL
HIV1 P24 AG SERPL QL IA: NORMAL
TREPONEMA PALLIDUM IGG+IGM AB [PRESENCE] IN SERUM OR PLASMA BY IMMUNOASSAY: NORMAL

## 2024-10-04 ENCOUNTER — TELEPHONE (OUTPATIENT)
Dept: FAMILY MEDICINE CLINIC | Facility: CLINIC | Age: 24
End: 2024-10-04

## 2024-10-16 ENCOUNTER — ANNUAL EXAM (OUTPATIENT)
Dept: OBGYN CLINIC | Facility: CLINIC | Age: 24
End: 2024-10-16

## 2024-10-16 VITALS
WEIGHT: 119.4 LBS | HEART RATE: 96 BPM | DIASTOLIC BLOOD PRESSURE: 64 MMHG | BODY MASS INDEX: 19.87 KG/M2 | SYSTOLIC BLOOD PRESSURE: 97 MMHG

## 2024-10-16 DIAGNOSIS — Z30.42 ENCOUNTER FOR MANAGEMENT AND INJECTION OF DEPO-PROVERA: Primary | ICD-10-CM

## 2024-10-16 DIAGNOSIS — Z01.419 ENCOUNTER FOR ANNUAL ROUTINE GYNECOLOGICAL EXAMINATION: ICD-10-CM

## 2024-10-16 LAB — SL AMB POCT URINE HCG: NEGATIVE

## 2024-10-16 PROCEDURE — 81025 URINE PREGNANCY TEST: CPT | Performed by: NURSE PRACTITIONER

## 2024-10-16 PROCEDURE — 99395 PREV VISIT EST AGE 18-39: CPT | Performed by: NURSE PRACTITIONER

## 2024-10-16 PROCEDURE — 96372 THER/PROPH/DIAG INJ SC/IM: CPT | Performed by: NURSE PRACTITIONER

## 2024-10-16 PROCEDURE — G0145 SCR C/V CYTO,THINLAYER,RESCR: HCPCS | Performed by: STUDENT IN AN ORGANIZED HEALTH CARE EDUCATION/TRAINING PROGRAM

## 2024-10-16 PROCEDURE — G0124 SCREEN C/V THIN LAYER BY MD: HCPCS | Performed by: STUDENT IN AN ORGANIZED HEALTH CARE EDUCATION/TRAINING PROGRAM

## 2024-10-16 RX ORDER — MEDROXYPROGESTERONE ACETATE 150 MG/ML
150 INJECTION, SUSPENSION INTRAMUSCULAR ONCE
Status: COMPLETED | OUTPATIENT
Start: 2024-10-16 | End: 2024-10-16

## 2024-10-16 RX ADMIN — MEDROXYPROGESTERONE ACETATE 150 MG: 150 INJECTION, SUSPENSION INTRAMUSCULAR at 12:32

## 2024-10-16 NOTE — PROGRESS NOTES
Annual Exam    Assessment   1. Encounter for management and injection of depo-Provera  medroxyPROGESTERone (DEPO-PROVERA) IM injection 150 mg    POCT urine HCG      2. Encounter for annual routine gynecological examination  Liquid-based pap, screening        well woman       Plan       All questions answered.  Breast self exam technique reviewed and patient encouraged to perform self-exam monthly.  Contraception: Depo-Provera injections.  Discussed healthy lifestyle modifications.  Follow up in 1 year.  Thin prep Pap smear.     Patient Instructions   PAP results can take up to 2 weeks  Call with needs or concerns  Return for next Depo in 3 months  Return for next HPV vaccines when due  Annual GYN exam in 1 year  Pt verbalized understanding of all discussed.      Jenna Sanches is a 24 y.o.  female who presents for annual well woman exam. Periods are rare with Depo, lasting 2 days. No intermenstrual bleeding, spotting, or discharge.  First PAP today  Negative STD testing 2024  No current partner   First HPV vaccine 2024    Depression Screening Follow-up Plan: Patient's depression screening was negative with a PHQ-2 score of 0. Their PHQ-9 score was 0. Patient with underlying depression and was advised to continue current medications as prescribed.      Current contraception: Depo-Provera injections  History of abnormal Pap smear: First today  Family history of uterine or ovarian cancer: no  Regular self breast exam: yes  History of abnormal mammogram: N/A  Family history of breast cancer: no  History of abnormal lipids: unknown  Menstrual History:  OB History          5    Para   1    Term           1    AB   2    Living   1         SAB   2    IAB        Ectopic        Multiple        Live Births   1                Menarche age: 16  Patient's last menstrual period was 10/11/2024 (exact date).       The following portions of the patient's history were reviewed and  updated as appropriate: allergies, current medications, past family history, past medical history, past social history, past surgical history and problem list.    Review of Systems  Pertinent items are noted in HPI.      Objective      BP 97/64 (BP Location: Right arm, Patient Position: Sitting, Cuff Size: Standard)   Pulse 96   Wt 54.2 kg (119 lb 6.4 oz)   LMP 10/11/2024 (Exact Date)   BMI 19.87 kg/m²     General: alert and oriented, in no acute distress, alert, appears stated age, and cooperative   Heart: NSR   Lungs: clear to auscultation bilaterally, WNL effort, negative cough or SOB   Thyroid: Negative masses palpable   Abdomen: soft, non-tender, without masses or organomegaly palpble   Vulva: normal   Vagina: normal mucosa   Cervix: no bleeding following Pap, no cervical motion tenderness, and no lesions   Uterus: normal size, non-tender, normal shape and consistency   Adnexa: normal adnexa   Urethra: normal   Breasts: NT,negative masses plpable, negative discharge, or dimpling

## 2024-10-16 NOTE — PATIENT INSTRUCTIONS
PAP results can take up to 2 weeks  Call with needs or concerns  Return for next Depo in 3 months  Return for next HPV vaccines when due  Annual GYN exam in 1 year

## 2024-10-16 NOTE — PROGRESS NOTES
Depo given to patient in left deltoid on 10/16/2024. Pregnancy test came out negative.    NDC: 64137-577-25  LOT: [id number]  EXP: 11/30/2026

## 2024-10-24 LAB
LAB AP GYN PRIMARY INTERPRETATION: ABNORMAL
Lab: ABNORMAL
PATH INTERP SPEC-IMP: ABNORMAL

## 2024-10-28 PROBLEM — R87.610 ATYPICAL SQUAMOUS CELLS OF UNDETERMINED SIGNIFICANCE (ASCUS) ON PAPANICOLAOU SMEAR OF CERVIX: Status: ACTIVE | Noted: 2024-10-28

## 2024-10-30 ENCOUNTER — TELEPHONE (OUTPATIENT)
Dept: OBGYN CLINIC | Facility: CLINIC | Age: 24
End: 2024-10-30

## 2024-11-18 ENCOUNTER — HOSPITAL ENCOUNTER (EMERGENCY)
Facility: HOSPITAL | Age: 24
Discharge: HOME | End: 2024-11-18
Attending: EMERGENCY MEDICINE
Payer: COMMERCIAL

## 2024-11-18 VITALS
BODY MASS INDEX: 20.25 KG/M2 | WEIGHT: 121.69 LBS | TEMPERATURE: 98.3 F | HEART RATE: 77 BPM | DIASTOLIC BLOOD PRESSURE: 59 MMHG | SYSTOLIC BLOOD PRESSURE: 113 MMHG | RESPIRATION RATE: 18 BRPM | OXYGEN SATURATION: 96 %

## 2024-11-18 DIAGNOSIS — N76.0 BV (BACTERIAL VAGINOSIS): ICD-10-CM

## 2024-11-18 DIAGNOSIS — B96.89 BV (BACTERIAL VAGINOSIS): ICD-10-CM

## 2024-11-18 DIAGNOSIS — N89.8 VAGINAL DISCHARGE: ICD-10-CM

## 2024-11-18 DIAGNOSIS — Z11.3 SCREEN FOR STD (SEXUALLY TRANSMITTED DISEASE): ICD-10-CM

## 2024-11-18 DIAGNOSIS — R30.0 DYSURIA: ICD-10-CM

## 2024-11-18 DIAGNOSIS — R31.9 HEMATURIA: Primary | ICD-10-CM

## 2024-11-18 LAB
BACTERIA UR QL AUTO: ABNORMAL /HPF
BILIRUB UR QL STRIP: NEGATIVE
CLARITY UR: CLEAR
COLOR UR: ABNORMAL
EXT PREGNANCY TEST URINE: NEGATIVE
EXT. CONTROL: NORMAL
GLUCOSE UR STRIP-MCNC: NEGATIVE MG/DL
HGB UR QL STRIP.AUTO: NEGATIVE
KETONES UR STRIP-MCNC: NEGATIVE MG/DL
LEUKOCYTE ESTERASE UR QL STRIP: ABNORMAL
MUCOUS THREADS UR QL AUTO: ABNORMAL
NITRITE UR QL STRIP: NEGATIVE
NON-SQ EPI CELLS URNS QL MICRO: ABNORMAL /HPF
PH UR STRIP.AUTO: 5.5 [PH] (ref 4.5–8)
PROT UR STRIP-MCNC: NEGATIVE MG/DL
RBC #/AREA URNS AUTO: ABNORMAL /HPF
SP GR UR STRIP.AUTO: >=1.03 (ref 1–1.03)
UROBILINOGEN UR QL STRIP.AUTO: 0.2 E.U./DL
WBC #/AREA URNS AUTO: ABNORMAL /HPF

## 2024-11-18 PROCEDURE — 81025 URINE PREGNANCY TEST: CPT | Performed by: EMERGENCY MEDICINE

## 2024-11-18 PROCEDURE — 81001 URINALYSIS AUTO W/SCOPE: CPT

## 2024-11-18 PROCEDURE — 87491 CHLMYD TRACH DNA AMP PROBE: CPT | Performed by: PHYSICIAN ASSISTANT

## 2024-11-18 PROCEDURE — 87480 CANDIDA DNA DIR PROBE: CPT | Performed by: PHYSICIAN ASSISTANT

## 2024-11-18 PROCEDURE — 87660 TRICHOMONAS VAGIN DIR PROBE: CPT | Performed by: PHYSICIAN ASSISTANT

## 2024-11-18 PROCEDURE — 87510 GARDNER VAG DNA DIR PROBE: CPT | Performed by: PHYSICIAN ASSISTANT

## 2024-11-18 PROCEDURE — 99284 EMERGENCY DEPT VISIT MOD MDM: CPT | Performed by: PHYSICIAN ASSISTANT

## 2024-11-18 PROCEDURE — 87591 N.GONORRHOEAE DNA AMP PROB: CPT | Performed by: PHYSICIAN ASSISTANT

## 2024-11-18 PROCEDURE — 99283 EMERGENCY DEPT VISIT LOW MDM: CPT

## 2024-11-18 NOTE — DISCHARGE INSTRUCTIONS
DISCHARGE INSTRUCTIONS:    FOLLOW UP WITH YOUR PRIMARY CARE PROVIDER OR THE [facility]. MAKE AN APPOINTMENT TO BE SEEN.     REST AND DRINK PLENTY OF FLUIDS.    IF SYMPTOMS WORSEN OR NEW SYMPTOMS ARISE, RETURN TO THE ER TO BE SEEN.

## 2024-11-18 NOTE — ED PROVIDER NOTES
Time reflects when diagnosis was documented in both MDM as applicable and the Disposition within this note       Time User Action Codes Description Comment    11/18/2024 11:55 AM Radha Henriquez Add [R31.9] Hematuria     11/18/2024 11:55 AM Radha Henriquez Add [R30.0] Dysuria     11/18/2024 11:56 AM Radha Henriquez A Add [N89.8] Vaginal discharge     11/18/2024 11:57 AM Radha Henriquez Add [Z11.3] Screen for STD (sexually transmitted disease)           ED Disposition       ED Disposition   Discharge    Condition   Stable    Date/Time   Mon Nov 18, 2024 11:55 AM    Comment   Bel Junaidjasmina discharge to home/self care.                   Assessment & Plan       Medical Decision Making  24y.o female presents to the ER for dysuria and hematuria for 1 day. Vitals are stable. Patient is in no acute distress. On exam, breathing is non-labored. No tachypnea or accessory muscle use. Lungs are clear. Heart is regular rate and rhythm. Abdomen is soft and non-tender. No guarding, rigidity, distention or pulsatile masses palpated. No CVAT present. DDX consists of but not limited to: UTI, STD, kidney stone (less likely without CVAT), vagnitis. Will check urine, pregnancy, vaginosis probe and gc/chlamydia.    1146 Leukocytes, UA(!): Trace    1146 WBC, UA(!): 2-4    1146 MUCUS THREADS(!): Innumerable    1158 Bacteria, UA: None Seen - Will hold off on treatment for UTI with urine findings. Will discharge. Patient agreeable.    The management plan was discussed in detail with the patient at bedside and all questions were answered.  Prior to discharge, we provided both verbal and written instructions.  We discussed with the patient the signs and symptoms for which to return to the emergency department.  All questions were answered and patient was comfortable with the plan of care and discharged to home.  Instructed the patient to follow up with the primary care provider and/or specialist provided and their written  instructions.  The patient verbalized understanding of our discussion and plan of care, and agrees to return to the Emergency Department for concerns and progression of illness.    At discharge, I instructed the patient to:  -follow up with pcp  -rest and drink plenty of fluids  -return to the ER if symptoms worsened or new symptoms arose  Patient agreed to this plan and was stable at time of discharge.           Problems Addressed:  Dysuria: acute illness or injury  Hematuria: acute illness or injury  Screen for STD (sexually transmitted disease): acute illness or injury  Vaginal discharge: acute illness or injury    Amount and/or Complexity of Data Reviewed  Independent Historian:      Details: Patient is historian  Labs: ordered. Decision-making details documented in ED Course.        ED Course as of 11/18/24 1557   Mon Nov 18, 2024   1146 Leukocytes, UA(!): Trace   1146 WBC, UA(!): 2-4   1146 MUCUS THREADS(!): Innumerable   1158 Bacteria, UA: None Seen       Medications - No data to display    ED Risk Strat Scores                           SBIRT 20yo+      Flowsheet Row Most Recent Value   Initial Alcohol Screen: US AUDIT-C     1. How often do you have a drink containing alcohol? 0 Filed at: 11/18/2024 1136   2. How many drinks containing alcohol do you have on a typical day you are drinking?  0 Filed at: 11/18/2024 1136   3b. FEMALE Any Age, or MALE 65+: How often do you have 4 or more drinks on one occassion? 0 Filed at: 11/18/2024 1136   Audit-C Score 0 Filed at: 11/18/2024 1136   IRVING: How many times in the past year have you...    Used an illegal drug or used a prescription medication for non-medical reasons? Never Filed at: 11/18/2024 1136                            History of Present Illness       Chief Complaint   Patient presents with    Possible UTI     C/o burning when she pees and hematuria. States she has vaginal discharge in the morning but has not noted a color to it. Denies concern for std        History reviewed. No pertinent past medical history.   History reviewed. No pertinent surgical history.   History reviewed. No pertinent family history.   Social History     Tobacco Use    Smoking status: Every Day     Current packs/day: 0.50     Types: Cigarettes    Smokeless tobacco: Never   Vaping Use    Vaping status: Some Days    Substances: Nicotine, THC, Flavoring   Substance Use Topics    Alcohol use: Never    Drug use: Never      E-Cigarette/Vaping    E-Cigarette Use Current Some Day User       E-Cigarette/Vaping Substances    Nicotine Yes     THC Yes     CBD No     Flavoring Yes     Other No     Unknown No       I have reviewed and agree with the history as documented.     24y.o female with no significant PMH presents to the ER for dysuria and hematuria for 1 day. Patient denies taking any medication for symptoms. She describes her pain as burning and non-radiating. Symptoms are constant. She also reports green vaginal discharge this morning, frequency and urgency. She denies fever, chills, URI symptoms, chest pain, dyspnea, N/V/D, abdominal pain, weakness or paresthesias. Patient states at times this feels like her period but then goes to say that she doesn't get her period because she has a Nexplanon.        History provided by:  Patient   used: No        Review of Systems   Constitutional:  Negative for activity change, appetite change, chills and fever.   HENT:  Negative for congestion, drooling, ear discharge, ear pain, facial swelling, rhinorrhea and sore throat.    Eyes:  Negative for redness.   Respiratory:  Negative for cough and shortness of breath.    Cardiovascular:  Negative for chest pain.   Gastrointestinal:  Negative for abdominal pain, diarrhea, nausea and vomiting.   Genitourinary:  Positive for dysuria, frequency, hematuria, urgency and vaginal discharge. Negative for flank pain and vaginal bleeding.   Musculoskeletal:  Negative for neck stiffness.   Skin:   Negative for rash.   Allergic/Immunologic: Negative for food allergies.   Neurological:  Negative for weakness and numbness.           Objective       ED Triage Vitals [11/18/24 1113]   Temperature Pulse Blood Pressure Respirations SpO2 Patient Position - Orthostatic VS   98.3 °F (36.8 °C) 77 113/59 18 96 % Sitting      Temp Source Heart Rate Source BP Location FiO2 (%) Pain Score    Oral Monitor Right arm -- --      Vitals      Date and Time Temp Pulse SpO2 Resp BP Pain Score FACES Pain Rating User   11/18/24 1113 98.3 °F (36.8 °C) 77 96 % 18 113/59 -- -- HW            Physical Exam  Vitals and nursing note reviewed.   Constitutional:       General: She is not in acute distress.     Appearance: She is not toxic-appearing.   HENT:      Head: Normocephalic and atraumatic.      Mouth/Throat:      Lips: Pink. No lesions.      Mouth: Mucous membranes are moist.   Eyes:      Conjunctiva/sclera: Conjunctivae normal.   Neck:      Trachea: No tracheal deviation.   Cardiovascular:      Rate and Rhythm: Normal rate and regular rhythm.      Heart sounds: Normal heart sounds, S1 normal and S2 normal. No murmur heard.     No friction rub. No gallop.   Pulmonary:      Effort: Pulmonary effort is normal. No respiratory distress.      Breath sounds: Normal breath sounds. No decreased breath sounds, wheezing, rhonchi or rales.   Chest:      Chest wall: No tenderness.   Abdominal:      General: Bowel sounds are normal. There is no distension.      Palpations: Abdomen is soft.      Tenderness: There is no abdominal tenderness. There is no right CVA tenderness, left CVA tenderness, guarding or rebound.   Musculoskeletal:      Cervical back: Normal range of motion and neck supple.   Skin:     General: Skin is warm and dry.      Findings: No rash.   Neurological:      Mental Status: She is alert.      GCS: GCS eye subscore is 4. GCS verbal subscore is 5. GCS motor subscore is 6.   Psychiatric:         Mood and Affect: Mood normal.          Results Reviewed       Procedure Component Value Units Date/Time    Chlamydia/GC amplified DNA by PCR [[id number]] Collected: 11/18/24 1214    Lab Status: In process Specimen: Urine, Other Updated: 11/18/24 1244    VAGINITIS/VAGINOSIS, DNA PROBE [[id number]] Collected: 11/18/24 1240    Lab Status: In process Specimen: Genital from Vaginal Updated: 11/18/24 1243    Narrative:      The following orders were created for panel order VAGINITIS/VAGINOSIS, DNA PROBE.  Procedure                               Abnormality         Status                     ---------                               -----------         ------                     Vaginosis DNA Probe[[id number]]                              In process                   Please view results for these tests on the individual orders.    Vaginosis DNA Probe [[id number]] Collected: 11/18/24 1240    Lab Status: In process Specimen: Genital from Vaginal Updated: 11/18/24 1243    Urine Microscopic [[id number]]  (Abnormal) Collected: 11/18/24 1128    Lab Status: Final result Specimen: Urine, Clean Catch Updated: 11/18/24 1142     RBC, UA 1-2 /hpf      WBC, UA 2-4 /hpf      Epithelial Cells None Seen /hpf      Bacteria, UA None Seen /hpf      MUCUS THREADS Innumerable    POCT pregnancy, urine [[id number]]  (Normal) Collected: 11/18/24 1130    Lab Status: Final result Updated: 11/18/24 1130     EXT Preg Test, Ur Negative     Control Valid    Urine Macroscopic, POC [[id number]]  (Abnormal) Collected: 11/18/24 1128    Lab Status: Final result Specimen: Urine Updated: 11/18/24 1130     Color, UA Zhanna     Clarity, UA Clear     pH, UA 5.5     Leukocytes, UA Trace     Nitrite, UA Negative     Protein, UA Negative mg/dl      Glucose, UA Negative mg/dl      Ketones, UA Negative mg/dl      Urobilinogen, UA 0.2 E.U./dl      Bilirubin, UA Negative     Occult Blood, UA Negative     Specific Gravity, UA >=1.030    Narrative:      CLINITEK RESULT            No orders to display        Procedures    ED Medication and Procedure Management   Prior to Admission Medications   Prescriptions Last Dose Informant Patient Reported? Taking?   brompheniramine-pseudoephedrine-DM 30-2-10 MG/5ML syrup   No No   Sig: Take 5 mL by mouth 4 (four) times a day as needed for cough or congestion   Patient not taking: Reported on 9/25/2024   medroxyPROGESTERone (DEPO-PROVERA) 150 mg/mL injection   No No   Sig: Inject 1 mL (150 mg total) into a muscle every 3 (three) months   phenazopyridine (PYRIDIUM) 200 mg tablet   No No   Sig: Take 1 tablet (200 mg total) by mouth 3 (three) times a day   Patient not taking: Reported on 9/25/2024      Facility-Administered Medications: None     Discharge Medication List as of 11/18/2024 11:57 AM        CONTINUE these medications which have NOT CHANGED    Details   brompheniramine-pseudoephedrine-DM 30-2-10 MG/5ML syrup Take 5 mL by mouth 4 (four) times a day as needed for cough or congestion, Starting Wed 9/25/2024, Normal      medroxyPROGESTERone (DEPO-PROVERA) 150 mg/mL injection Inject 1 mL (150 mg total) into a muscle every 3 (three) months, Starting Wed 9/25/2024, Normal      phenazopyridine (PYRIDIUM) 200 mg tablet Take 1 tablet (200 mg total) by mouth 3 (three) times a day, Starting Wed 7/31/2024, Normal           No discharge procedures on file.  ED SEPSIS DOCUMENTATION   Time reflects when diagnosis was documented in both MDM as applicable and the Disposition within this note       Time User Action Codes Description Comment    11/18/2024 11:55 AM Radha Henriquez Add [R31.9] Hematuria     11/18/2024 11:55 AM Radha Henriquez Add [R30.0] Dysuria     11/18/2024 11:56 AM Radha Henriquez Add [N89.8] Vaginal discharge     11/18/2024 11:57 AM Radha Henriquez Add [Z11.3] Screen for STD (sexually transmitted disease)                  Radha Henriquez PA-C  11/18/24 1600

## 2024-11-19 ENCOUNTER — RESULTS FOLLOW-UP (OUTPATIENT)
Dept: EMERGENCY DEPT | Facility: HOSPITAL | Age: 24
End: 2024-11-19

## 2024-11-19 LAB
C TRACH DNA SPEC QL NAA+PROBE: NEGATIVE
CANDIDA RRNA VAG QL PROBE: NOT DETECTED
G VAGINALIS RRNA GENITAL QL PROBE: DETECTED
N GONORRHOEA DNA SPEC QL NAA+PROBE: NEGATIVE
T VAGINALIS RRNA GENITAL QL PROBE: NOT DETECTED

## 2024-11-19 RX ORDER — METRONIDAZOLE 500 MG/1
500 TABLET ORAL EVERY 12 HOURS SCHEDULED
Qty: 14 TABLET | Refills: 0 | Status: SHIPPED | OUTPATIENT
Start: 2024-11-19 | End: 2024-11-26

## 2025-01-06 ENCOUNTER — OFFICE VISIT (OUTPATIENT)
Dept: FAMILY MEDICINE CLINIC | Facility: CLINIC | Age: 25
End: 2025-01-06

## 2025-01-06 VITALS
TEMPERATURE: 98 F | DIASTOLIC BLOOD PRESSURE: 78 MMHG | SYSTOLIC BLOOD PRESSURE: 118 MMHG | HEIGHT: 61 IN | BODY MASS INDEX: 23.6 KG/M2 | OXYGEN SATURATION: 98 % | RESPIRATION RATE: 16 BRPM | WEIGHT: 125 LBS | HEART RATE: 100 BPM

## 2025-01-06 DIAGNOSIS — Z00.00 ANNUAL PHYSICAL EXAM: Primary | ICD-10-CM

## 2025-01-06 DIAGNOSIS — Z02.4 DRIVER'S PERMIT PE (PHYSICAL EXAMINATION): ICD-10-CM

## 2025-01-06 PROBLEM — D50.9 IDA (IRON DEFICIENCY ANEMIA): Status: ACTIVE | Noted: 2022-06-03

## 2025-01-06 PROBLEM — N93.9 VAGINAL SPOTTING: Status: RESOLVED | Noted: 2022-03-30 | Resolved: 2025-01-06

## 2025-01-06 PROCEDURE — 99395 PREV VISIT EST AGE 18-39: CPT

## 2025-01-06 NOTE — PROGRESS NOTES
Adult Annual Physical  Name: Bel Sanches      : 2000      MRN: [id number]  Encounter Provider: CARA Cohen  Encounter Date: 2025   Encounter department: [facility]    Assessment & Plan  Annual physical exam  Immunizations and preventive care screenings were discussed with patient today. Appropriate education was printed on patient's after visit summary.        's permit PE (physical examination)  Unfortunately patient's vision does not meet State requirements. She was advised to see an optometrist and return with corrective lenses for reevaluation.            Counseling:  Alcohol/drug use: discussed moderation in alcohol intake, the recommendations for healthy alcohol use, and avoidance of illicit drug use.  Sexual health: discussed sexually transmitted diseases, partner selection, use of condoms, avoidance of unintended pregnancy, and contraceptive alternatives.  Exercise: the importance of regular exercise/physical activity was discussed. Recommend exercise 3-5 times per week for at least 30 minutes.     Depression Screening and Follow-up Plan: Patient was screened for depression during today's encounter. They screened negative with a PHQ-2 score of 0.    Tobacco Cessation Counseling: The patient is sincerely urged to quit consumption of tobacco. She is not ready to quit tobacco.         History of Present Illness     Adult Annual Physical:  Patient presents for annual physical. Bel Oliva is a 24 year old female who presents to the office today for a routine physical exam and completion of a 's permit form.   She has no acute concerns for today's visit. .     Diet and Physical Activity:  - Diet/Nutrition: well balanced diet.  - Exercise: walking.    Depression Screening:  - PHQ-2 Score: 0    General Health:  - Sleep: sleeps well and 4-6 hours of sleep on average.  - Hearing: normal hearing bilateral ears.  - Vision: vision  problems and most recent eye exam < 1 year ago. has prescription but has to buy the glasses  - Dental: regular dental visits and brushes teeth twice daily.    /GYN Health:  - Follows with GYN: yes.   - Last menstrual cycle: 12/10/2024.   - Contraception: injectable contraception.      Review of Systems   Constitutional:  Negative for chills and fever.   HENT:  Negative for ear pain and sore throat.    Eyes:  Negative for pain and visual disturbance.   Respiratory:  Negative for cough and shortness of breath.    Cardiovascular:  Negative for chest pain and palpitations.   Gastrointestinal:  Negative for abdominal pain and vomiting.   Genitourinary:  Negative for dysuria and hematuria.   Musculoskeletal:  Negative for arthralgias and back pain.   Skin:  Negative for color change and rash.   Neurological:  Negative for seizures and syncope.   All other systems reviewed and are negative.    Pertinent Medical History   Patient Active Problem List   Diagnosis    Tobacco abuse    Atypical squamous cells of undetermined significance (ASCUS) on Papanicolaou smear of cervix    LYNDON (iron deficiency anemia)         Medical History Reviewed by provider this encounter:  Tobacco  Allergies  Meds  Problems  Med Hx  Surg Hx  Fam Hx     .  Current Outpatient Medications on File Prior to Visit   Medication Sig Dispense Refill    medroxyPROGESTERone (DEPO-PROVERA) 150 mg/mL injection Inject 1 mL (150 mg total) into a muscle every 3 (three) months 1 mL 5    [DISCONTINUED] brompheniramine-pseudoephedrine-DM 30-2-10 MG/5ML syrup Take 5 mL by mouth 4 (four) times a day as needed for cough or congestion (Patient not taking: Reported on 1/6/2025) 120 mL 0    [DISCONTINUED] phenazopyridine (PYRIDIUM) 200 mg tablet Take 1 tablet (200 mg total) by mouth 3 (three) times a day (Patient not taking: Reported on 1/6/2025) 6 tablet 0     No current facility-administered medications on file prior to visit.      Social History     Tobacco Use  "   Smoking status: Every Day     Current packs/day: 0.50     Types: Cigarettes    Smokeless tobacco: Never   Vaping Use    Vaping status: Every Day    Substances: Nicotine, THC, Flavoring   Substance and Sexual Activity    Alcohol use: Yes     Comment: one bottle of liquor    Drug use: Never    Sexual activity: Yes     Partners: Male     Birth control/protection: None       Objective   /78 (BP Location: Right arm, Patient Position: Sitting, Cuff Size: Standard)   Pulse 100   Temp 98 °F (36.7 °C) (Temporal)   Resp 16   Ht 5' 1.22\" (1.555 m)   Wt 56.7 kg (125 lb)   LMP 12/10/2024 (Approximate)   SpO2 98%   BMI 23.45 kg/m²     Physical Exam  Vitals and nursing note reviewed.   Constitutional:       General: She is not in acute distress.     Appearance: She is well-developed.   HENT:      Head: Normocephalic and atraumatic.      Right Ear: Tympanic membrane, ear canal and external ear normal.      Left Ear: Tympanic membrane, ear canal and external ear normal.      Nose: Nose normal. No congestion or rhinorrhea.      Mouth/Throat:      Mouth: Mucous membranes are moist.      Pharynx: Oropharynx is clear. No oropharyngeal exudate or posterior oropharyngeal erythema.   Eyes:      Extraocular Movements: Extraocular movements intact.      Conjunctiva/sclera: Conjunctivae normal.      Pupils: Pupils are equal, round, and reactive to light.   Cardiovascular:      Rate and Rhythm: Normal rate and regular rhythm.      Pulses: Normal pulses.      Heart sounds: Normal heart sounds. No murmur heard.  Pulmonary:      Effort: Pulmonary effort is normal. No respiratory distress.      Breath sounds: Normal breath sounds. No wheezing.   Abdominal:      General: Bowel sounds are normal.      Palpations: Abdomen is soft.      Tenderness: There is no abdominal tenderness.   Musculoskeletal:         General: No swelling.      Cervical back: Neck supple.   Lymphadenopathy:      Cervical: No cervical adenopathy.   Skin:     " General: Skin is warm and dry.      Capillary Refill: Capillary refill takes less than 2 seconds.      Findings: No lesion or rash.   Neurological:      General: No focal deficit present.      Mental Status: She is alert.      Motor: No weakness.   Psychiatric:         Mood and Affect: Mood normal.         Behavior: Behavior normal.       Vision Screening    Right eye Left eye Both eyes   Without correction 20/70 20/70 20/70   With correction

## 2025-01-08 ENCOUNTER — TELEPHONE (OUTPATIENT)
Dept: OTHER | Facility: OTHER | Age: 25
End: 2025-01-08

## 2025-01-08 NOTE — TELEPHONE ENCOUNTER
Bel called to inquired if the office has the depo shot or if she needs to pick it up at the pharmacy.     Please advise. She has an appt today at 6:30 pm

## 2025-01-09 ENCOUNTER — CLINICAL SUPPORT (OUTPATIENT)
Dept: OBGYN CLINIC | Facility: CLINIC | Age: 25
End: 2025-01-09

## 2025-01-09 VITALS
WEIGHT: 120 LBS | HEART RATE: 102 BPM | SYSTOLIC BLOOD PRESSURE: 105 MMHG | BODY MASS INDEX: 22.51 KG/M2 | DIASTOLIC BLOOD PRESSURE: 74 MMHG

## 2025-01-09 DIAGNOSIS — Z30.09 UNWANTED FERTILITY: Primary | ICD-10-CM

## 2025-01-09 PROCEDURE — 96372 THER/PROPH/DIAG INJ SC/IM: CPT

## 2025-01-09 RX ORDER — MEDROXYPROGESTERONE ACETATE 150 MG/ML
150 INJECTION, SUSPENSION INTRAMUSCULAR ONCE
Status: COMPLETED | OUTPATIENT
Start: 2025-01-09 | End: 2025-01-09

## 2025-01-09 RX ADMIN — MEDROXYPROGESTERONE ACETATE 150 MG: 150 INJECTION, SUSPENSION INTRAMUSCULAR at 16:40

## 2025-01-09 NOTE — TELEPHONE ENCOUNTER
Called PT and notify she has to contact the pharmacy and refill the DEPO and  before her appointment and PT verbalize understanding.

## 2025-03-28 ENCOUNTER — TELEPHONE (OUTPATIENT)
Dept: OBGYN CLINIC | Facility: CLINIC | Age: 25
End: 2025-03-28

## 2025-07-23 ENCOUNTER — HOSPITAL ENCOUNTER (EMERGENCY)
Facility: HOSPITAL | Age: 25
Discharge: HOME | End: 2025-07-23
Attending: EMERGENCY MEDICINE | Admitting: EMERGENCY MEDICINE

## 2025-07-23 ENCOUNTER — APPOINTMENT (EMERGENCY)
Dept: ULTRASOUND IMAGING | Facility: HOSPITAL | Age: 25
End: 2025-07-23

## 2025-07-23 ENCOUNTER — APPOINTMENT (EMERGENCY)
Dept: CT IMAGING | Facility: HOSPITAL | Age: 25
End: 2025-07-23

## 2025-07-23 VITALS
WEIGHT: 124.12 LBS | TEMPERATURE: 99 F | OXYGEN SATURATION: 99 % | DIASTOLIC BLOOD PRESSURE: 62 MMHG | BODY MASS INDEX: 23.28 KG/M2 | SYSTOLIC BLOOD PRESSURE: 110 MMHG | RESPIRATION RATE: 18 BRPM | HEART RATE: 95 BPM

## 2025-07-23 DIAGNOSIS — A74.9 CHLAMYDIA: ICD-10-CM

## 2025-07-23 DIAGNOSIS — N83.201 HEMORRHAGIC CYST OF RIGHT OVARY: Primary | ICD-10-CM

## 2025-07-23 LAB
ALBUMIN SERPL BCG-MCNC: 4.2 G/DL (ref 3.5–5)
ALP SERPL-CCNC: 68 U/L (ref 34–104)
ALT SERPL W P-5'-P-CCNC: 8 U/L (ref 7–52)
ANION GAP SERPL CALCULATED.3IONS-SCNC: 10 MMOL/L (ref 4–13)
AST SERPL W P-5'-P-CCNC: 9 U/L (ref 13–39)
BACTERIA UR QL AUTO: ABNORMAL /HPF
BASOPHILS # BLD AUTO: 0.02 THOUSANDS/ÂΜL (ref 0–0.1)
BASOPHILS NFR BLD AUTO: 0 % (ref 0–1)
BILIRUB SERPL-MCNC: 0.4 MG/DL (ref 0.2–1)
BILIRUB UR QL STRIP: ABNORMAL
BUN SERPL-MCNC: 9 MG/DL (ref 5–25)
CALCIUM SERPL-MCNC: 9.5 MG/DL (ref 8.4–10.2)
CHLORIDE SERPL-SCNC: 102 MMOL/L (ref 96–108)
CLARITY UR: CLEAR
CO2 SERPL-SCNC: 26 MMOL/L (ref 21–32)
COLOR UR: YELLOW
CREAT SERPL-MCNC: 0.57 MG/DL (ref 0.6–1.3)
EOSINOPHIL # BLD AUTO: 0.02 THOUSAND/ÂΜL (ref 0–0.61)
EOSINOPHIL NFR BLD AUTO: 0 % (ref 0–6)
ERYTHROCYTE [DISTWIDTH] IN BLOOD BY AUTOMATED COUNT: 13.2 % (ref 11.6–15.1)
EXT PREGNANCY TEST URINE: NEGATIVE
EXT. CONTROL: NORMAL
GFR SERPL CREATININE-BSD FRML MDRD: 131 ML/MIN/1.73SQ M
GLUCOSE SERPL-MCNC: 90 MG/DL (ref 65–140)
GLUCOSE UR STRIP-MCNC: NEGATIVE MG/DL
HCT VFR BLD AUTO: 33.7 % (ref 34.8–46.1)
HGB BLD-MCNC: 10.9 G/DL (ref 11.5–15.4)
HGB UR QL STRIP.AUTO: NEGATIVE
IMM GRANULOCYTES # BLD AUTO: 0.03 THOUSAND/UL (ref 0–0.2)
IMM GRANULOCYTES NFR BLD AUTO: 0 % (ref 0–2)
KETONES UR STRIP-MCNC: ABNORMAL MG/DL
LEUKOCYTE ESTERASE UR QL STRIP: NEGATIVE
LIPASE SERPL-CCNC: 9 U/L (ref 11–82)
LYMPHOCYTES # BLD AUTO: 1.58 THOUSANDS/ÂΜL (ref 0.6–4.47)
LYMPHOCYTES NFR BLD AUTO: 14 % (ref 14–44)
MCH RBC QN AUTO: 30.4 PG (ref 26.8–34.3)
MCHC RBC AUTO-ENTMCNC: 32.3 G/DL (ref 31.4–37.4)
MCV RBC AUTO: 94 FL (ref 82–98)
MONOCYTES # BLD AUTO: 0.68 THOUSAND/ÂΜL (ref 0.17–1.22)
MONOCYTES NFR BLD AUTO: 6 % (ref 4–12)
MUCOUS THREADS UR QL AUTO: ABNORMAL
NEUTROPHILS # BLD AUTO: 8.61 THOUSANDS/ÂΜL (ref 1.85–7.62)
NEUTS SEG NFR BLD AUTO: 80 % (ref 43–75)
NITRITE UR QL STRIP: NEGATIVE
NON-SQ EPI CELLS URNS QL MICRO: ABNORMAL /HPF
NRBC BLD AUTO-RTO: 0 /100 WBCS
PH UR STRIP.AUTO: 5.5 [PH] (ref 4.5–8)
PLATELET # BLD AUTO: 372 THOUSANDS/UL (ref 149–390)
PMV BLD AUTO: 8.6 FL (ref 8.9–12.7)
POTASSIUM SERPL-SCNC: 3.5 MMOL/L (ref 3.5–5.3)
PROT SERPL-MCNC: 7.9 G/DL (ref 6.4–8.4)
PROT UR STRIP-MCNC: ABNORMAL MG/DL
RBC # BLD AUTO: 3.58 MILLION/UL (ref 3.81–5.12)
RBC #/AREA URNS AUTO: ABNORMAL /HPF
SODIUM SERPL-SCNC: 138 MMOL/L (ref 135–147)
SP GR UR STRIP.AUTO: >=1.03 (ref 1–1.03)
UROBILINOGEN UR QL STRIP.AUTO: 0.2 E.U./DL
WBC # BLD AUTO: 10.94 THOUSAND/UL (ref 4.31–10.16)
WBC #/AREA URNS AUTO: ABNORMAL /HPF

## 2025-07-23 PROCEDURE — 74177 CT ABD & PELVIS W/CONTRAST: CPT

## 2025-07-23 PROCEDURE — 87591 N.GONORRHOEAE DNA AMP PROB: CPT | Performed by: PHYSICIAN ASSISTANT

## 2025-07-23 PROCEDURE — 76856 US EXAM PELVIC COMPLETE: CPT

## 2025-07-23 PROCEDURE — 85025 COMPLETE CBC W/AUTO DIFF WBC: CPT | Performed by: PHYSICIAN ASSISTANT

## 2025-07-23 PROCEDURE — 96361 HYDRATE IV INFUSION ADD-ON: CPT

## 2025-07-23 PROCEDURE — 99284 EMERGENCY DEPT VISIT MOD MDM: CPT

## 2025-07-23 PROCEDURE — 36415 COLL VENOUS BLD VENIPUNCTURE: CPT | Performed by: PHYSICIAN ASSISTANT

## 2025-07-23 PROCEDURE — 83690 ASSAY OF LIPASE: CPT | Performed by: PHYSICIAN ASSISTANT

## 2025-07-23 PROCEDURE — 87491 CHLMYD TRACH DNA AMP PROBE: CPT | Performed by: PHYSICIAN ASSISTANT

## 2025-07-23 PROCEDURE — 81001 URINALYSIS AUTO W/SCOPE: CPT

## 2025-07-23 PROCEDURE — 96375 TX/PRO/DX INJ NEW DRUG ADDON: CPT

## 2025-07-23 PROCEDURE — 80053 COMPREHEN METABOLIC PANEL: CPT | Performed by: PHYSICIAN ASSISTANT

## 2025-07-23 PROCEDURE — 81025 URINE PREGNANCY TEST: CPT | Performed by: PHYSICIAN ASSISTANT

## 2025-07-23 PROCEDURE — 76830 TRANSVAGINAL US NON-OB: CPT

## 2025-07-23 PROCEDURE — 96374 THER/PROPH/DIAG INJ IV PUSH: CPT

## 2025-07-23 RX ORDER — KETOROLAC TROMETHAMINE 30 MG/ML
15 INJECTION, SOLUTION INTRAMUSCULAR; INTRAVENOUS ONCE
Status: COMPLETED | OUTPATIENT
Start: 2025-07-23 | End: 2025-07-23

## 2025-07-23 RX ORDER — IBUPROFEN 600 MG/1
600 TABLET, FILM COATED ORAL EVERY 6 HOURS PRN
Qty: 30 TABLET | Refills: 0 | Status: SHIPPED | OUTPATIENT
Start: 2025-07-23

## 2025-07-23 RX ORDER — HYDROMORPHONE HCL/PF 1 MG/ML
1 SYRINGE (ML) INJECTION ONCE
Refills: 0 | Status: COMPLETED | OUTPATIENT
Start: 2025-07-23 | End: 2025-07-23

## 2025-07-23 RX ADMIN — IOHEXOL 100 ML: 350 INJECTION, SOLUTION INTRAVENOUS at 19:19

## 2025-07-23 RX ADMIN — SODIUM CHLORIDE 1000 ML: 0.9 INJECTION, SOLUTION INTRAVENOUS at 18:11

## 2025-07-23 RX ADMIN — HYDROMORPHONE HYDROCHLORIDE 1 MG: 1 INJECTION, SOLUTION INTRAMUSCULAR; INTRAVENOUS; SUBCUTANEOUS at 18:14

## 2025-07-23 RX ADMIN — KETOROLAC TROMETHAMINE 15 MG: 30 INJECTION, SOLUTION INTRAMUSCULAR at 21:15

## 2025-07-23 NOTE — ED PROVIDER NOTES
ED Disposition       None          Assessment & Plan       Medical Decision Making  25y.o female presents to the ER for diffuse abdominal pain for 5 days. Patient is tachycardic. Will monitor. Otherwise vitals are stable. Patient is in no acute distress. On exam, breathing is non-labored. No tachypnea or accessory muscle use. Lungs are clear. Heart is regular rhythm. Abdomen is soft but tender all over. RUQ seems to be the most tender. No guarding, rigidity, distention or pulsatile masses palpated. DDX consists of but not limited to: cholecystitis, pancreatitis, appendicitis, gastritis, PUD, gastroenteritis, ovarian cyst. Will check labs and imaging.    1803 Leukocytes, UA: Negative    1803 Blood, UA: Negative    1803 Ketones, UA(!): Trace    1803 PREGNANCY TEST URINE: Negative    1815 WBC(!): 10.94    1815 Hemoglobin(!): 10.9    1815 Platelet Count: 372    1839 LIPASE(!): 9    1839 Creatinine(!): 0.57    1839 AST(!): 9 2016 CT abdomen pelvis with contrast  Nonspecific pelvic edema and potential inflammatory pelvic fluid suspicious for PID.     Follow-up with pelvic ultrasound to assess for potential right pyosalpinx.     The study was marked in EPIC for immediate notification.    2126     Patient signed out to Meghan Oleary PA-C awaiting US. Patient is stable.    Amount and/or Complexity of Data Reviewed  Independent Historian:      Details: Patient is historian  Labs: ordered. Decision-making details documented in ED Course.  Radiology: ordered. Decision-making details documented in ED Course.    Risk  Prescription drug management.        ED Course as of 07/23/25 2128 Wed Jul 23, 2025   1803 Leukocytes, UA: Negative   1803 Blood, UA: Negative   1803 Ketones, UA(!): Trace   1803 PREGNANCY TEST URINE: Negative   1815 WBC(!): 10.94   1815 Hemoglobin(!): 10.9   1815 Platelet Count: 372   1839 LIPASE(!): 9   1839 Creatinine(!): 0.57   1839 AST(!): 9 2016 CT abdomen pelvis with contrast  Nonspecific pelvic  edema and potential inflammatory pelvic fluid suspicious for PID.     Follow-up with pelvic ultrasound to assess for potential right pyosalpinx.     The study was marked in EPIC for immediate notification.         Medications   sodium chloride 0.9 % bolus 1,000 mL (0 mL Intravenous Stopped 7/23/25 1912)   HYDROmorphone (DILAUDID) injection 1 mg (1 mg Intravenous Given 7/23/25 1814)   iohexol (OMNIPAQUE) 350 MG/ML injection (SINGLE-DOSE) 100 mL (100 mL Intravenous Given 7/23/25 1919)   ketorolac (TORADOL) injection 15 mg (15 mg Intravenous Given 7/23/25 2115)       ED Risk Strat Scores                    No data recorded                            History of Present Illness       Chief Complaint   Patient presents with    Abdominal Pain     Lower abdominal pain 5 days, denies N/V/D       Past Medical History[1]   Past Surgical History[2]   Family History[3]   Social History[4]   E-Cigarette/Vaping    E-Cigarette Use Current Every Day User       E-Cigarette/Vaping Substances    Nicotine Yes     THC Yes     CBD No     Flavoring Yes     Other No     Unknown No       I have reviewed and agree with the history as documented.     25y.o female with no significant PMH presents to the ER for diffuse abdominal pain for 5 days. Patient has been taking Motrin for pain. She is unable to describe her pain. She reports pain radiates into her lower back. Pain is constant. She denies fever, chills, URI symptoms, chest pain, dyspnea, N/V/D, urinary symptoms, vaginal discharge, weakness or paresthesias.      History provided by:  Patient   used: No        Review of Systems   Constitutional:  Negative for activity change, appetite change, chills and fever.   HENT:  Negative for congestion, drooling, ear discharge, ear pain, facial swelling, rhinorrhea and sore throat.    Eyes:  Negative for redness.   Respiratory:  Negative for cough and shortness of breath.    Cardiovascular:  Negative for chest pain.    Gastrointestinal:  Positive for abdominal pain. Negative for diarrhea, nausea and vomiting.   Genitourinary:  Negative for dysuria, frequency, hematuria, urgency, vaginal bleeding and vaginal discharge.   Musculoskeletal:  Negative for neck stiffness.   Skin:  Negative for rash.   Allergic/Immunologic: Negative for food allergies.   Neurological:  Negative for weakness and numbness.           Objective       ED Triage Vitals [07/23/25 1705]   Temperature Pulse Blood Pressure Respirations SpO2 Patient Position - Orthostatic VS   99 °F (37.2 °C) (!) 110 120/56 18 100 % Sitting      Temp Source Heart Rate Source BP Location FiO2 (%) Pain Score    Oral Monitor Right arm -- 10 - Worst Possible Pain      Vitals      Date and Time Temp Pulse SpO2 Resp BP Pain Score FACES Pain Rating User   07/23/25 2115 -- -- -- -- -- 10 - Worst Possible Pain -- AEN   07/23/25 1830 -- 96 99 % 18 114/58 -- -- AEN   07/23/25 1814 -- -- -- -- -- 10 - Worst Possible Pain -- EC   07/23/25 1705 99 °F (37.2 °C) 110 100 % 18 120/56 10 - Worst Possible Pain -- AW            Physical Exam  Vitals and nursing note reviewed.   Constitutional:       General: She is not in acute distress.     Appearance: She is not toxic-appearing.   HENT:      Head: Normocephalic and atraumatic.      Mouth/Throat:      Lips: Pink. No lesions.      Mouth: Mucous membranes are moist.     Eyes:      Conjunctiva/sclera: Conjunctivae normal.     Neck:      Trachea: No tracheal deviation.     Cardiovascular:      Rate and Rhythm: Normal rate and regular rhythm.      Heart sounds: Normal heart sounds, S1 normal and S2 normal. No murmur heard.     No friction rub. No gallop.   Pulmonary:      Effort: Pulmonary effort is normal. No respiratory distress.      Breath sounds: Normal breath sounds. No decreased breath sounds, wheezing, rhonchi or rales.   Chest:      Chest wall: No tenderness.   Abdominal:      General: Bowel sounds are normal. There is no distension.       Palpations: Abdomen is soft.      Tenderness: There is generalized abdominal tenderness. There is no guarding or rebound.     Musculoskeletal:      Cervical back: Normal range of motion and neck supple.     Skin:     General: Skin is warm and dry.      Findings: No rash.     Neurological:      Mental Status: She is alert.      GCS: GCS eye subscore is 4. GCS verbal subscore is 5. GCS motor subscore is 6.     Psychiatric:         Mood and Affect: Mood normal.         Results Reviewed       Procedure Component Value Units Date/Time    Chlamydia/GC amplified DNA by PCR [[id number]] Collected: 07/23/25 2048    Lab Status: In process Specimen: Urine, Other Updated: 07/23/25 2058    Lipase [[id number]]  (Abnormal) Collected: 07/23/25 1758    Lab Status: Final result Specimen: Blood from Arm, Left Updated: 07/23/25 1835     Lipase 9 u/L     Comprehensive metabolic panel [[id number]]  (Abnormal) Collected: 07/23/25 1758    Lab Status: Final result Specimen: Blood from Arm, Left Updated: 07/23/25 1835     Sodium 138 mmol/L      Potassium 3.5 mmol/L      Chloride 102 mmol/L      CO2 26 mmol/L      ANION GAP 10 mmol/L      BUN 9 mg/dL      Creatinine 0.57 mg/dL      Glucose 90 mg/dL      Calcium 9.5 mg/dL      AST 9 U/L      ALT 8 U/L      Alkaline Phosphatase 68 U/L      Total Protein 7.9 g/dL      Albumin 4.2 g/dL      Total Bilirubin 0.40 mg/dL      eGFR 131 ml/min/1.73sq m     Narrative:      National Kid[employer] Disease Foundation guidelines for Chronic Kidney Disease (CKD):     Stage 1 with normal or high GFR (GFR > 90 mL/min/1.73 square meters)    Stage 2 Mild CKD (GFR = 60-89 mL/min/1.73 square meters)    Stage 3A Moderate CKD (GFR = 45-59 mL/min/1.73 square meters)    Stage 3B Moderate CKD (GFR = 30-44 mL/min/1.73 square meters)    Stage 4 Severe CKD (GFR = 15-29 mL/min/1.73 square meters)    Stage 5 End Stage CKD (GFR <15 mL/min/1.73 square meters)  Note: GFR calculation is accurate only with a steady state creatinine     Urine Microscopic [[id number]]  (Abnormal) Collected: 07/23/25 1801    Lab Status: Final result Specimen: Urine, Clean Catch Updated: 07/23/25 1828     RBC, UA 1-2 /hpf      WBC, UA 1-2 /hpf      Epithelial Cells Occasional /hpf      Bacteria, UA None Seen /hpf      MUCUS THREADS Innumerable    CBC and differential [[id number]]  (Abnormal) Collected: 07/23/25 1758    Lab Status: Final result Specimen: Blood from Arm, Left Updated: 07/23/25 1810     WBC 10.94 Thousand/uL      RBC 3.58 Million/uL      Hemoglobin 10.9 g/dL      Hematocrit 33.7 %      MCV 94 fL      MCH 30.4 pg      MCHC 32.3 g/dL      RDW 13.2 %      MPV 8.6 fL      Platelets 372 Thousands/uL      nRBC 0 /100 WBCs      Segmented % 80 %      Immature Grans % 0 %      Lymphocytes % 14 %      Monocytes % 6 %      Eosinophils Relative 0 %      Basophils Relative 0 %      Absolute Neutrophils 8.61 Thousands/µL      Absolute Immature Grans 0.03 Thousand/uL      Absolute Lymphocytes 1.58 Thousands/µL      Absolute Monocytes 0.68 Thousand/µL      Eosinophils Absolute 0.02 Thousand/µL      Basophils Absolute 0.02 Thousands/µL     POCT pregnancy, urine [[id number]]  (Normal) Collected: 07/23/25 1803    Lab Status: Final result Updated: 07/23/25 1803     EXT Preg Test, Ur Negative     Control Valid    Urine Macroscopic, POC [[id number]]  (Abnormal) Collected: 07/23/25 1801    Lab Status: Final result Specimen: Urine Updated: 07/23/25 1802     Color, UA Yellow     Clarity, UA Clear     pH, UA 5.5     Leukocytes, UA Negative     Nitrite, UA Negative     Protein, UA 30 (1+) mg/dl      Glucose, UA Negative mg/dl      Ketones, UA Trace mg/dl      Urobilinogen, UA 0.2 E.U./dl      Bilirubin, UA Small     Occult Blood, UA Negative     Specific Gravity, UA >=1.030    Narrative:      [facility] RESULT            CT abdomen pelvis with contrast   Final Interpretation by Chris Reilly MD (07/23 2005)      Nonspecific pelvic edema and potential inflammatory pelvic  fluid suspicious for PID.      Follow-up with pelvic ultrasound to assess for potential right pyosalpinx.      The study was marked in EPIC for immediate notification.         Resident: Prince [place] I, the attending radiologist, have reviewed the images and agree with the final report above.      Workstation performed: [id number]         US pelvis complete w transvaginal    (Results Pending)       Procedures    ED Medication and Procedure Management   Prior to Admission Medications   Prescriptions Last Dose Informant Patient Reported? Taking?   medroxyPROGESTERone (DEPO-PROVERA) 150 mg/mL injection   No No   Sig: Inject 1 mL (150 mg total) into a muscle every 3 (three) months      Facility-Administered Medications: None     Patient's Medications   Discharge Prescriptions    No medications on file     No discharge procedures on file.  ED SEPSIS DOCUMENTATION                [1] No past medical history on file.  [2] No past surgical history on file.  [3] No family history on file.  [4]   Social History  Tobacco Use    Smoking status: Every Day     Current packs/day: 0.50     Types: Cigarettes    Smokeless tobacco: Never   Vaping Use    Vaping status: Every Day    Substances: Nicotine, THC, Flavoring   Substance Use Topics    Alcohol use: Not Currently     Comment: one bottle of liquor    Drug use: Never        Radha Henriquez PA-C  07/23/25 [id number]

## 2025-07-24 LAB
C TRACH DNA SPEC QL NAA+PROBE: POSITIVE
N GONORRHOEA DNA SPEC QL NAA+PROBE: NEGATIVE

## 2025-07-24 RX ORDER — DOXYCYCLINE 100 MG/1
100 CAPSULE ORAL EVERY 12 HOURS SCHEDULED
Qty: 14 CAPSULE | Refills: 0 | Status: SHIPPED | OUTPATIENT
Start: 2025-07-24 | End: 2025-07-31

## 2025-07-24 NOTE — ED CARE HANDOFF
Emergency Department Sign Out Note        Sign out and transfer of care from Radha Henriquez PA-C. See Separate Emergency Department note.     The patient, Bel Sanches, was evaluated by the previous provider for abdominal pain.    Workup Completed:  Results Reviewed       Procedure Component Value Units Date/Time    Chlamydia/GC amplified DNA by PCR [[id number]] Collected: 07/23/25 2048    Lab Status: In process Specimen: Urine, Other Updated: 07/23/25 2058    Lipase [[id number]]  (Abnormal) Collected: 07/23/25 1758    Lab Status: Final result Specimen: Blood from Arm, Left Updated: 07/23/25 1835     Lipase 9 u/L     Comprehensive metabolic panel [[id number]]  (Abnormal) Collected: 07/23/25 1758    Lab Status: Final result Specimen: Blood from Arm, Left Updated: 07/23/25 1835     Sodium 138 mmol/L      Potassium 3.5 mmol/L      Chloride 102 mmol/L      CO2 26 mmol/L      ANION GAP 10 mmol/L      BUN 9 mg/dL      Creatinine 0.57 mg/dL      Glucose 90 mg/dL      Calcium 9.5 mg/dL      AST 9 U/L      ALT 8 U/L      Alkaline Phosphatase 68 U/L      Total Protein 7.9 g/dL      Albumin 4.2 g/dL      Total Bilirubin 0.40 mg/dL      eGFR 131 ml/min/1.73sq m     Narrative:      [employer] guidelines for Chronic Kidney Disease (CKD):     Stage 1 with normal or high GFR (GFR > 90 mL/min/1.73 square meters)    Stage 2 Mild CKD (GFR = 60-89 mL/min/1.73 square meters)    Stage 3A Moderate CKD (GFR = 45-59 mL/min/1.73 square meters)    Stage 3B Moderate CKD (GFR = 30-44 mL/min/1.73 square meters)    Stage 4 Severe CKD (GFR = 15-29 mL/min/1.73 square meters)    Stage 5 End Stage CKD (GFR <15 mL/min/1.73 square meters)  Note: GFR calculation is accurate only with a steady state creatinine    Urine Microscopic [[id number]]  (Abnormal) Collected: 07/23/25 1801    Lab Status: Final result Specimen: Urine, Clean Catch Updated: 07/23/25 1828     RBC, UA 1-2 /hpf      WBC, UA 1-2 /hpf      Epithelial Cells  Occasional /hpf      Bacteria, UA None Seen /hpf      MUCUS THREADS Innumerable    CBC and differential [[id number]]  (Abnormal) Collected: 07/23/25 1758    Lab Status: Final result Specimen: Blood from Arm, Left Updated: 07/23/25 1810     WBC 10.94 Thousand/uL      RBC 3.58 Million/uL      Hemoglobin 10.9 g/dL      Hematocrit 33.7 %      MCV 94 fL      MCH 30.4 pg      MCHC 32.3 g/dL      RDW 13.2 %      MPV 8.6 fL      Platelets 372 Thousands/uL      nRBC 0 /100 WBCs      Segmented % 80 %      Immature Grans % 0 %      Lymphocytes % 14 %      Monocytes % 6 %      Eosinophils Relative 0 %      Basophils Relative 0 %      Absolute Neutrophils 8.61 Thousands/µL      Absolute Immature Grans 0.03 Thousand/uL      Absolute Lymphocytes 1.58 Thousands/µL      Absolute Monocytes 0.68 Thousand/µL      Eosinophils Absolute 0.02 Thousand/µL      Basophils Absolute 0.02 Thousands/µL     POCT pregnancy, urine [[id number]]  (Normal) Collected: 07/23/25 1803    Lab Status: Final result Updated: 07/23/25 1803     EXT Preg Test, Ur Negative     Control Valid    Urine Macroscopic, POC [[id number]]  (Abnormal) Collected: 07/23/25 1801    Lab Status: Final result Specimen: Urine Updated: 07/23/25 1802     Color, UA Yellow     Clarity, UA Clear     pH, UA 5.5     Leukocytes, UA Negative     Nitrite, UA Negative     Protein, UA 30 (1+) mg/dl      Glucose, UA Negative mg/dl      Ketones, UA Trace mg/dl      Urobilinogen, UA 0.2 E.U./dl      Bilirubin, UA Small     Occult Blood, UA Negative     Specific Gravity, UA >=1.030    Narrative:      CL[facility] RESULT          CT abdomen pelvis.    ED Course / Workup Pending (followup):  Transvaginal ultrasound        No data recorded                          ED Course as of 07/23/25 2206 Wed Jul 23, 2025 2123 Dispo pending US, discuss with OB if needed   2155 US pelvis complete w transvaginal  IMPRESSION:     Right ovarian hemorrhagic cyst/corpus luteum. No sonographic evidence of pyosalpinx.      Trace free fluid in the pelvis.     Procedures  Medical Decision Making      Patient received in signout.  Patient presented to the ED for evaluation of abdominal pain.  On CT patient noted to have Nonspecific pelvic edema and potential inflammatory pelvic fluid suspicious for PID, was recommended to have a transvaginal ultrasound to assess for potential right pyosalpinx.  Ultrasound with evidence of a hemorrhagic cyst, no pyosalpinx noted.  Patient not having any vaginal discharge or urinary symptoms.  Will hold off on treatment with antibiotics pending STD cultures.    Prior to discharge, discharge instructions were discussed with patient at bedside. Patient was provided both verbal and written instructions. Patient is understanding of the discharge instructions and is agreeable to plan of care. Return precautions were discussed with patient bedside, patient verbalized understanding of signs and symptoms that would necessitate return to the ED. All questions were answered. Patient was comfortable with the plan of care and discharged to home.     Dispo: discharge home with follow up to OB/GYN. Patient stable, in no acute distress and non-toxic at discharge.    Problems Addressed:  Hemorrhagic cyst of right ovary: acute illness or injury    Amount and/or Complexity of Data Reviewed  Labs: ordered.  Radiology: ordered. Decision-making details documented in ED Course.    Risk  Prescription drug management.            Disposition  Final diagnoses:   Hemorrhagic cyst of right ovary     Time reflects when diagnosis was documented in both MDM as applicable and the Disposition within this note       Time User Action Codes Description Comment    7/23/2025  9:59 PM Meghan Oleary Add [N83.201] Hemorrhagic cyst of right ovary           ED Disposition       ED Disposition   Discharge    Condition   Stable    Date/Time   Wed Jul 23, 2025  9:58 PM    Comment   Bel Sanches discharge to home/self care.              Follow-up Information       Follow up With Specialties Details Why Contact Info Additional Information    [facility]necology Schedule an appointment as soon as possible for a visit   [address] [place] [address]  [address]  [facility] [address] [phone] [facility], [address] [place] [address], [address], [place], [place], [address] [phone]          Patient's Medications   Discharge Prescriptions    IBUPROFEN (MOTRIN) 600 MG TABLET    Take 1 tablet (600 mg total) by mouth every 6 (six) hours as needed for mild pain       Start Date: 7/23/2025 End Date: --       Order Dose: 600 mg       Quantity: 30 tablet    Refills: 0     No discharge procedures on file.       ED Provider  Electronically Signed by GEETA Garner PA-C  07/23/25 9867

## 2025-07-24 NOTE — DISCHARGE INSTRUCTIONS
Please refer to the attached information for strict return instructions.  If symptoms worsen or new symptoms develop please return to the ER.  Please follow-up with your  OB/GYN for re-evaluation of symptoms.

## 2025-08-07 ENCOUNTER — TELEPHONE (OUTPATIENT)
Dept: OBGYN CLINIC | Facility: CLINIC | Age: 25
End: 2025-08-07

## 2025-08-07 ENCOUNTER — OFFICE VISIT (OUTPATIENT)
Dept: OBGYN CLINIC | Facility: CLINIC | Age: 25
End: 2025-08-07

## 2025-08-07 VITALS
WEIGHT: 124 LBS | HEIGHT: 61 IN | DIASTOLIC BLOOD PRESSURE: 68 MMHG | SYSTOLIC BLOOD PRESSURE: 100 MMHG | BODY MASS INDEX: 23.41 KG/M2

## 2025-08-07 DIAGNOSIS — R87.610 ATYPICAL SQUAMOUS CELLS OF UNDETERMINED SIGNIFICANCE (ASCUS) ON PAPANICOLAOU SMEAR OF CERVIX: ICD-10-CM

## 2025-08-07 DIAGNOSIS — N83.209 HEMORRHAGIC OVARIAN CYST: ICD-10-CM

## 2025-08-07 DIAGNOSIS — A74.9 CHLAMYDIA INFECTION: Primary | ICD-10-CM

## 2025-08-07 DIAGNOSIS — Z72.51 HIGH RISK HETEROSEXUAL BEHAVIOR: ICD-10-CM

## 2025-08-07 DIAGNOSIS — Z30.42 ENCOUNTER FOR SURVEILLANCE OF INJECTABLE CONTRACEPTIVE: ICD-10-CM

## 2025-08-07 LAB — SL AMB POCT URINE HCG: NEGATIVE

## 2025-08-07 PROCEDURE — 81025 URINE PREGNANCY TEST: CPT | Performed by: OBSTETRICS & GYNECOLOGY

## 2025-08-07 PROCEDURE — 99213 OFFICE O/P EST LOW 20 MIN: CPT | Performed by: OBSTETRICS & GYNECOLOGY

## 2025-08-07 RX ORDER — DOXYCYCLINE 100 MG/1
100 TABLET ORAL 2 TIMES DAILY
COMMUNITY
End: 2025-08-07 | Stop reason: SDUPTHER

## 2025-08-07 RX ORDER — DOXYCYCLINE 100 MG/1
100 TABLET ORAL 2 TIMES DAILY
Qty: 14 TABLET | Refills: 0 | Status: SHIPPED | OUTPATIENT
Start: 2025-08-07 | End: 2025-08-14

## 2025-08-07 RX ORDER — MEDROXYPROGESTERONE ACETATE 150 MG/ML
150 INJECTION, SUSPENSION INTRAMUSCULAR
Qty: 1 ML | Refills: 5 | Status: SHIPPED | OUTPATIENT
Start: 2025-08-07

## 2025-08-08 ENCOUNTER — TELEPHONE (OUTPATIENT)
Dept: OBGYN CLINIC | Facility: CLINIC | Age: 25
End: 2025-08-08

## 2025-08-21 ENCOUNTER — CLINICAL SUPPORT (OUTPATIENT)
Dept: OBGYN CLINIC | Facility: CLINIC | Age: 25
End: 2025-08-21

## 2025-08-21 VITALS — WEIGHT: 128.8 LBS | SYSTOLIC BLOOD PRESSURE: 100 MMHG | DIASTOLIC BLOOD PRESSURE: 72 MMHG | BODY MASS INDEX: 24.16 KG/M2

## 2025-08-21 DIAGNOSIS — Z30.42 DEPO-PROVERA CONTRACEPTIVE STATUS: Primary | ICD-10-CM

## 2025-08-21 DIAGNOSIS — Z30.42 ENCOUNTER FOR SURVEILLANCE OF INJECTABLE CONTRACEPTIVE: ICD-10-CM

## 2025-08-21 PROCEDURE — 96372 THER/PROPH/DIAG INJ SC/IM: CPT

## 2025-08-21 RX ORDER — MEDROXYPROGESTERONE ACETATE 150 MG/ML
150 INJECTION, SUSPENSION INTRAMUSCULAR ONCE
Status: COMPLETED | OUTPATIENT
Start: 2025-08-21 | End: 2025-08-21

## 2025-08-21 RX ADMIN — MEDROXYPROGESTERONE ACETATE 150 MG: 150 INJECTION, SUSPENSION INTRAMUSCULAR at 09:18
